# Patient Record
Sex: FEMALE | Race: WHITE | URBAN - METROPOLITAN AREA
[De-identification: names, ages, dates, MRNs, and addresses within clinical notes are randomized per-mention and may not be internally consistent; named-entity substitution may affect disease eponyms.]

---

## 2021-07-09 ENCOUNTER — HOSPITAL ENCOUNTER (OUTPATIENT)
Dept: PHYSICAL THERAPY | Age: 61
Discharge: HOME | End: 2021-07-09

## 2021-07-09 PROCEDURE — 9990000029 HC GAP PACKAGE: Performed by: SPECIALIST/TECHNOLOGIST

## 2021-07-09 NOTE — FLOWSHEET NOTE
[facility], [place] [address] [address]  Phone: [phone]  Fax [phone]      Date:  2021    Patient Name:  Jasper Spann    :  1960  MRN: [id number]  Restrictions/Precautions:    Medical/Treatment Diagnosis Information:  ·  none - general strengthening  ·    Physician Information:   none    Patient is Post-Op [] Yes   [] No     DOS:           21         Subjective Coming with a friend to get Pilates routine going          Always sleeps on 45 degree angle - back pain laying flat                    1/7 2/7 3/7 4/7 5/7 6/7 7/7   Objective Hamstring 90                   Goals 1.  Decrease back pain, TA strengthening                   Reformer Exercises Squats 2R1B 2x10  Walking 2R 2x10  Raises 2R 2x10         Pelvic Stabilization   Bridges 3R 2x10  Bar lowered due to back pain          Standing 1R1Y 2x10                             Trunk Stabilization Attempted lat pulls, caused too much strain on back to continue                                       Hip Disassociation 2R 2x10          arcs                    Flossing 2R 2x10         Scapular Stabilization                                        Thoracic Mobility                                        General ROM Hamstring 1 min                    Psoas 1R 1 min   Did not feel stretch as well                   Other                                        Summary/Comments HEP: kim, laying flat hamstring stretches                                          Electronically signed by:  Willa Hatch

## 2021-07-13 ENCOUNTER — HOSPITAL ENCOUNTER (OUTPATIENT)
Dept: PHYSICAL THERAPY | Age: 61
Discharge: HOME | End: 2021-07-13

## 2021-07-13 NOTE — FLOWSHEET NOTE
[facility], [address]  Phone: [phone]  Fax [phone]      Date:  2021    Patient Name:  Dagoberto Savage    :  1960  MRN: [id number]  Restrictions/Precautions:    Medical/Treatment Diagnosis Information:  ·  none - general strengthening     Physician Information:   none    Patient is Post-Op [] Yes   [] No     DOS:           21        Subjective Coming with a friend to get Pilates routine going Back felt fine after session. Has been trying to lay flat more. Always sleeps on 45 degree angle - back pain laying flat                    1/7 2/7 3/7 4/7 5/7 6/7 7/7   Objective Hamstring 90                   Goals 1.  Decrease back pain, TA strengthening                   Reformer Exercises Squats 2R1B 2x10  Walking 2R 2x10  Raises 2R 2x10 4# Soleus pump 3B 2x10        Pelvic Stabilization   Bridges 3R 2x10  Bar lowered due to back pain 1# bridges on trap table with posterior springs yellow long 2x10         Standing 1R1Y 2x10 3# 2x10                            Trunk Stabilization Attempted lat pulls, caused too much strain on back to continue 2# Trap table  Lat pulls, marches                                      Hip Disassociation 2R 2x10 5# 2R 2x10         arcs Arcs, V's circles                   Flossing 2R 2x10         Scapular Stabilization                                        Thoracic Mobility                                        General ROM Hamstring 1 min 6# 1 min                   Psoas 1R 1 min   Did not feel stretch as well                   Other                                        Summary/Comments HEP: bridges, laying flat hamstring stretches                                          Electronically signed by:  Lauren Suarez

## 2021-07-20 ENCOUNTER — HOSPITAL ENCOUNTER (OUTPATIENT)
Dept: PHYSICAL THERAPY | Age: 61
Discharge: HOME | End: 2021-07-20
Payer: COMMERCIAL

## 2021-07-20 NOTE — FLOWSHEET NOTE
[facility], [address]  Phone: [phone]  Fax [phone]      Date:  2021    Patient Name:  Den Jackson    :  1960  MRN: [id number]  Restrictions/Precautions:    Medical/Treatment Diagnosis Information:  ·  none - general strengthening     Physician Information:   none    Patient is Post-Op [] Yes   [] No     DOS:           21       Subjective Coming with a friend to get Pilates routine going Back felt fine after session. Has been trying to lay flat more. Doing ok from sessions. Trying to lie supine more, but having back pain sometimes. Always sleeps on 45 degree angle - back pain laying flat                    1/7 2/7 3/7 4/7 5/7 6/7 7/7   Objective Hamstring 90  Start with quiet supine laying 2 min                 Goals 1.  Decrease back pain, TA strengthening                   Reformer Exercises Squats 2R1B 2x10  Walking 2R 2x10  Raises 2R 2x10 4# Soleus pump 3B 2x10 #7  2x10 2R1G        2x10        2x10        U squat 2R1B 15x         Pelvic Stabilization   Bridges 3R 2x10  Bar lowered due to back pain 1# bridges on trap table with posterior springs yellow long 2x10 #3 2x10        Standing 1R1Y 2x10 3# 2x10 #10 15x                           Trunk Stabilization Attempted lat pulls, caused too much strain on back to continue 2# Trap table  Lat pulls, marches #5 15x                                     Hip Disassociation 2R 2x10 5# 2R 2x10 #8 2x10 2R1B        arcs Arcs, V's circles           #4 Trap table marches and biking long yellow spring around thigh 12x        Flossing 2R 2x10         Scapular Stabilization                                        Thoracic Mobility   #6 wunda assisted thoracic mobility - not to full extension                                     General ROM Hamstring 1 min 6# 1 min #9 1 min                  Psoas 1R 1 min   Did not feel stretch as well Other   #1 posterior pelvic tilt 10x  #2 SB knee to chest 15x                                     Summary/Comments HEP: bridges, laying flat hamstring stretches  Continue to progress                                        Electronically signed by:  Lou Mayes

## 2021-07-27 ENCOUNTER — HOSPITAL ENCOUNTER (OUTPATIENT)
Dept: PHYSICAL THERAPY | Age: 61
Discharge: HOME | End: 2021-07-27

## 2021-07-27 NOTE — FLOWSHEET NOTE
[facility], [address]  Phone: [phone]  Fax [phone]      Date:  2021    Patient Name:  Riddhi Hilliard    :  1960  MRN: [id number]  Restrictions/Precautions:    Medical/Treatment Diagnosis Information:  ·  none - general strengthening     Physician Information:   none    Patient is Post-Op [] Yes   [] No     DOS:           21      Subjective Coming with a friend to get Pilates routine going Back felt fine after session. Has been trying to lay flat more. Doing ok from sessions. Trying to lie supine more, but having back pain sometimes. Definitely felt it more after last time, but not in a bad way. Interested in having a couple of things to do at home. Always sleeps on 45 degree angle - back pain laying flat                    1/7 2/7 3/7 4/7 5/7 6/7 7/7   Objective Hamstring 90  Start with quiet supine laying 2 min HEP [employer]  [id number]                Goals 1.  Decrease back pain, TA strengthening                   Reformer Exercises    Lumbar Roll    3 folded towels under head with headrest up Squats 2R1B 2x10  Walking 2R 2x10  Raises 2R 2x10 4# Soleus pump 3B 2x10 #7  2x10 2R1G        2x10        2x10        U squat 2R1B 15x   #7  2x10 2R1G        2x10        2x10        U squat 2R1B 15x      Pelvic Stabilization   Bridges 3R 2x10  Bar lowered due to back pain 1# bridges on trap table with posterior springs yellow long 2x10 #3 2x10 #3 2x10       Standing 1R1Y 2x10 3# 2x10 #10 15x #10 15x                          Trunk Stabilization Attempted lat pulls, caused too much strain on back to continue 2# Trap table  Lat pulls, marches #5 15x #5 15x                                    Hip Disassociation 2R 2x10 5# 2R 2x10 #8 2x10 2R1B #8 2x10 2R1B       arcs Arcs, V's circles           #4 Trap table marches and biking long yellow spring around thigh 12x #4  12x       Flossing 2R 2x10 Scapular Stabilization                                        Thoracic Mobility   #6 wunda assisted thoracic mobility - not to full extension #6 3B 10x                                    General ROM Hamstring 1 min 6# 1 min #9 1 min #9 1 min                 Psoas 1R 1 min   Did not feel stretch as well                   Other   #1 posterior pelvic tilt 10x  #2 SB knee to chest 15x #1 posterior pelvic tilt 10x  #2 SB knee to chest 15x                                    Summary/Comments HEP: bridges, laying flat hamstring stretches  Continue to progress Access Code: [email or web address]  URL: [email or web address]  Date: 07/27/2021  Prepared by: Catrachito Montes    Exercises  Supine Posterior Pelvic Tilt - 1 x daily - 7 x weekly - 2 sets - 10 reps  Supine Hip and Knee Flexion AROM with Swiss Ball - 1 x daily - 7 x weekly - 2 sets - 10 reps  Seated Thoracic Extension AROM - 1 x daily - 7 x weekly - 1 sets - 10 reps  Cat-Camel - 1 x daily - 7 x weekly - 1 sets - 10 reps                                         Electronically signed by:  Catrachito Montes

## 2021-08-10 ENCOUNTER — HOSPITAL ENCOUNTER (OUTPATIENT)
Dept: PHYSICAL THERAPY | Age: 61
Discharge: HOME | End: 2021-08-10

## 2021-08-10 NOTE — FLOWSHEET NOTE
[facility], [facility], [address] [place] [address] Road  Phone: [phone]  Fax [phone]      Date:  8/10/2021    Patient Name:  Jeanna Lyons    :  1960  MRN: [id number]  Restrictions/Precautions:    Medical/Treatment Diagnosis Information:  ·  none - general strengthening     Physician Information:   none    Patient is Post-Op [] Yes   [] No     DOS:           7/9/21 7/13/21 7/20/21 7/27/21 8/10/21     Subjective Coming with a friend to get Pilates routine going Back felt fine after session. Has been trying to lay flat more. Doing ok from sessions. Trying to lie supine more, but having back pain sometimes. Definitely felt it more after last time, but not in a bad way. Interested in having a couple of things to do at home. Able to get exercises in to routine, not daily but close. Notices a small difference in pain decrease and function increase. Still has a spasm in back when going from supine to sit/stand      Always sleeps on 45 degree angle - back pain laying flat                    1/7 2/7 3/7 4/7 5/7 6/7 7/7   Objective Hamstring 90  Start with quiet supine laying 2 min HEP [employer]  [id number]                Goals 1.  Decrease back pain, TA strengthening                   Reformer Exercises    Lumbar Roll    3 folded towels under head with headrest up Squats 2R1B 2x10  Walking 2R 2x10  Raises 2R 2x10 4# Soleus pump 3B 2x10 #7  2x10 2R1G        2x10        2x10        U squat 2R1B 15x   #7  2x10 2R1G        2x10        2x10        U squat 2R1B 15x #7  25x 2R1G, ER        2x10        2x10/10sec stretch        U squat 2R1B 20x     Pelvic Stabilization   Bridges 3R 2x10  Bar lowered due to back pain 1# bridges on trap table with posterior springs yellow long 2x10 #3 2x10 #3 2x10 #3 2x10      Standing 1R1Y 2x10 3# 2x10 #10 15x #10 15x #11 2x10                         Trunk Stabilization Attempted lat pulls, caused too much strain on back to continue 2# Trap table  Lat pulls, marches #5 15x #5 15x #5 10x3 marches at a time    Tabletop position practice 4x 8sec                                   Hip Disassociation 2R 2x10 5# 2R 2x10 #8 2x10 2R1B #8 2x10 2R1B #8 2x10 2R1B      arcs Arcs, V's circles           #4 Trap table marches and biking long yellow spring around thigh 12x #4  12x #4 2x10      Flossing 2R 2x10         Scapular Stabilization                                        Thoracic Mobility   #6 wunda assisted thoracic mobility - not to full extension #6 3B 10x #6 3B 10x  Increased extension, still not full                                   General ROM Hamstring 1 min 6# 1 min #9 1 min #9 1 min #9 1 min          #10 post capsule      Psoas 1R 1 min   Did not feel stretch as well                   Other   #1 posterior pelvic tilt 10x  #2 SB knee to chest 15x #1 posterior pelvic tilt 10x  #2 SB knee to chest 15x #1 posterior pelvic tilt 10x  #2 SB knee to chest 15x                                   Summary/Comments HEP: bridges, laying flat hamstring stretches  Continue to progress Access Code: [email or web address]  URL: [email or web address]  Date: 07/27/2021  Prepared by: Kisha Benitez    Exercises  Supine Posterior Pelvic Tilt - 1 x daily - 7 x weekly - 2 sets - 10 reps  Supine Hip and Knee Flexion AROM with Swiss Ball - 1 x daily - 7 x weekly - 2 sets - 10 reps  Seated Thoracic Extension AROM - 1 x daily - 7 x weekly - 1 sets - 10 reps  Cat-Camel - 1 x daily - 7 x weekly - 1 sets - 10 reps                                         Electronically signed by:  Kisha Benitez

## 2021-08-20 ENCOUNTER — HOSPITAL ENCOUNTER (OUTPATIENT)
Dept: PHYSICAL THERAPY | Age: 61
Discharge: HOME | End: 2021-08-20

## 2021-08-20 NOTE — FLOWSHEET NOTE
[facility], [facility], [address] [place] [address] Road  Phone: [phone]  Fax [phone]      Date:  2021    Patient Name:  Zach Carmona    :  1960  MRN: [id number]  Restrictions/Precautions:    Medical/Treatment Diagnosis Information:  ·  none - general strengthening     Physician Information:   none    Patient is Post-Op [] Yes   [] No     DOS:           7/9/21 7/13/21 7/20/21 7/27/21 8/10/21 8/20/21    Subjective Coming with a friend to get Pilates routine going Back felt fine after session. Has been trying to lay flat more. Doing ok from sessions. Trying to lie supine more, but having back pain sometimes. Definitely felt it more after last time, but not in a bad way. Interested in having a couple of things to do at home. Able to get exercises in to routine, not daily but close. Notices a small difference in pain decrease and function increase. Still has a spasm in back when going from supine to sit/stand Doing well, working on HEP. Always sleeps on 45 degree angle - back pain laying flat                    1/7 2/7 3/7 4/7 5/7 6/7 7/7   Objective Hamstring 90  Start with quiet supine laying 2 min HEP [employer]  [id number]  Still requested warm up for this session              Goals 1.  Decrease back pain, TA strengthening                   Reformer Exercises    Lumbar Roll    3 folded towels under head with headrest up Squats 2R1B 2x10  Walking 2R 2x10  Raises 2R 2x10 4# Soleus pump 3B 2x10 #7  2x10 2R1G        2x10        2x10        U squat 2R1B 15x   #7  2x10 2R1G        2x10        2x10        U squat 2R1B 15x #7  25x 2R1G, ER        2x10        2x10/10sec stretch        U squat 2R1B 20x #7  25x 2R1G, ER        2x10        2x10/10sec stretch        U squat 2R1B 20x    Pelvic Stabilization   Bridges 3R 2x10  Bar lowered due to back pain 1# bridges on trap table with posterior springs yellow long 2x10 #3 2x10 #3 2x10 #3 2x10 #3 2x10

## 2021-08-27 ENCOUNTER — HOSPITAL ENCOUNTER (OUTPATIENT)
Dept: PHYSICAL THERAPY | Age: 61
Discharge: HOME | End: 2021-08-27

## 2021-08-27 NOTE — FLOWSHEET NOTE
[facility], [facility], [address] [place] [address] Road  Phone: [phone]  Fax [phone]      Date:  2021    Patient Name:  Geovanna Osborne    :  1960  MRN: [id number]  Restrictions/Precautions:    Medical/Treatment Diagnosis Information:  ·  none - general strengthening     Physician Information:   none    Patient is Post-Op [] Yes   [] No     DOS:           7/9/21 7/13/21 7/20/21 7/27/21 8/10/21 8/20/21 8/27/21   Subjective Coming with a friend to get Pilates routine going Back felt fine after session. Has been trying to lay flat more. Doing ok from sessions. Trying to lie supine more, but having back pain sometimes. Definitely felt it more after last time, but not in a bad way. Interested in having a couple of things to do at home. Able to get exercises in to routine, not daily but close. Notices a small difference in pain decrease and function increase. Still has a spasm in back when going from supine to sit/stand Doing well, working on HEP. Back has been more painful this week. May be from not doing HEP as much and helping a friend move. Always sleeps on 45 degree angle - back pain laying flat                    1/7 2/7 3/7 4/7 5/7 6/7 7/7   Objective Hamstring 90  Start with quiet supine laying 2 min HEP [employer]  [id number]  Still requested warm up for this session Discussion about referring to MD and PT or continuing with [employer] for another package. Decided on another package. Goals 1.  Decrease back pain, TA strengthening                   Reformer Exercises    Lumbar Roll    3 folded towels under head with headrest up Squats 2R1B 2x10  Walking 2R 2x10  Raises 2R 2x10 4# Soleus pump 3B 2x10 #7  2x10 2R1G        2x10        2x10        U squat 2R1B 15x   #7  2x10 2R1G        2x10        2x10        U squat 2R1B 15x #7  25x 2R1G, ER        2x10        2x10/10sec stretch        U squat 2R1B 20x #7  25x 2R1G, ER        2x10 2x10/10sec stretch        U squat 2R1B 20x #7  15x 2R1G        15x        15x/10sec stretch        U squat 2R1B 20x   Pelvic Stabilization   Bridges 3R 2x10  Bar lowered due to back pain 1# bridges on trap table with posterior springs yellow long 2x10 #3 2x10 #3 2x10 #3 2x10 #3 2x10 #3 2x10    Standing 1R1Y 2x10 3# 2x10 #10 15x #10 15x #11 2x10 #11 2x10 #12 2x10                       Trunk Stabilization Attempted lat pulls, caused too much strain on back to continue 2# Trap table  Lat pulls, marches #5 15x #5 15x #5 10x3 marches at a time    Tabletop position practice 4x 8sec #5 10x3 marches at a time    Tabletop position practice 4x 8sec #5 10x3 marches at a time    4x 8 sec with lat pull                                 Hip Disassociation 2R 2x10 5# 2R 2x10 #8 2x10 2R1B #8 2x10 2R1B #8 2x10 2R1B #8 2x10 2R  Responded well to less resistance for more ab work #8 2x10 2R    arcs Arcs, V's circles           #4 Trap table marches and biking long yellow spring around thigh 12x #4  12x #4 2x10 #4 2x10 #4 2x10    Flossing 2R 2x10         Scapular Stabilization       #11 rows 2R 2x10                                 Thoracic Mobility   #6 wunda assisted thoracic mobility - not to full extension #6 3B 10x #6 3B 10x  Increased extension, still not full #6 3B 10x #6 3B 15x                                 General ROM Hamstring 1 min 6# 1 min #9 1 min #9 1 min #9 1 min #9 1 min # 9 1 min        #10 post capsule #10 post capsule #10 1 min    Psoas 1R 1 min   Did not feel stretch as well      #13 mermaid             Other   #1 posterior pelvic tilt 10x  #2 SB knee to chest 15x #1 posterior pelvic tilt 10x  #2 SB knee to chest 15x #1 posterior pelvic tilt 10x  #2 SB knee to chest 15x #1 posterior pelvic tilt 10x  #2 SB knee to chest 15x #1 posterior pelvic tilt 10x  #2 SB knee to chest 15x                                 Summary/Comments HEP: bridges, laying flat hamstring stretches  Continue to progress Access Code: [email or web address]  URL: [email or web address]  Date: 07/27/2021  Prepared by: Bertram Okeefe    Exercises  Supine Posterior Pelvic Tilt - 1 x daily - 7 x weekly - 2 sets - 10 reps  Supine Hip and Knee Flexion AROM with Swiss Ball - 1 x daily - 7 x weekly - 2 sets - 10 reps  Seated Thoracic Extension AROM - 1 x daily - 7 x weekly - 1 sets - 10 reps  Cat-Camel - 1 x daily - 7 x weekly - 1 sets - 10 reps                                         Electronically signed by:  Bertram Okeefe

## 2021-09-14 ENCOUNTER — HOSPITAL ENCOUNTER (OUTPATIENT)
Dept: PHYSICAL THERAPY | Age: 61
Discharge: HOME | End: 2021-09-14

## 2021-09-14 PROCEDURE — 9990000029 HC GAP PACKAGE: Performed by: SPECIALIST/TECHNOLOGIST

## 2021-09-14 NOTE — FLOWSHEET NOTE
[facility]  Phone: [phone]  Fax [phone]      Date:  2021    Patient Name:  Kareem Napoles    :  1960  MRN: [id number]  Restrictions/Precautions:    Medical/Treatment Diagnosis Information:  ·  none - general strengthening     Physician Information:   none    Patient is Post-Op [] Yes   [] No     DOS:      Charge Package $210: 21         Subjective Doing a little better. Always sleeps on 45 degree angle - back pain laying flat                    1/7 2/7 3/7 4/7 5/7 6/7 7/7   Objective Hamstring 90  Start with quiet supine laying 2 min HEP [employer]  [id number]  Still requested warm up for this session Discussion about referring to MD and PT or continuing with [employer] for another package. Decided on another package. Goals 1.  Decrease back pain, TA strengthening                   Reformer Exercises    Lumbar Roll    3 folded towels under head with headrest up #7  15x 2R1G        15x        15x/10sec stretch        U squat 2R1B 20x 4# Soleus pump 3B 2x10 #7  2x10 2R1G        2x10        2x10        U squat 2R1B 15x   #7  2x10 2R1G        2x10        2x10        U squat 2R1B 15x #7  25x 2R1G, ER        2x10        2x10/10sec stretch        U squat 2R1B 20x #7  25x 2R1G, ER        2x10        2x10/10sec stretch        U squat 2R1B 20x #7  15x 2R1G        15x        15x/10sec stretch        U squat 2R1B 20x   Pelvic Stabilization   #3 2x10 1# bridges on trap table with posterior springs yellow long 2x10 #3 2x10 #3 2x10 #3 2x10 #3 2x10 #3 2x10    #12 Standing 1R1Y 2x10 3# 2x10 #10 15x #10 15x #11 2x10 #11 2x10 #12 2x10                       Trunk Stabilization #5 10x3 marches at a time    4x 8 sec with lat pull 2# Trap table  Lat pulls, marches #5 15x #5 15x #5 10x3 marches at a time    Tabletop position practice 4x 8sec #5 10x3 marches at a time    Tabletop position practice 4x 8sec #5 10x3 marches at a time    4x 8 sec with lat pull                                 Hip Disassociation #8 2R 2x10 5# 2R 2x10 #8 2x10 2R1B #8 2x10 2R1B #8 2x10 2R1B #8 2x10 2R  Responded well to less resistance for more ab work #8 2x10 2R     Arcs, V's circles         #4 Trap table marches and biking long yellow spring around thigh 12x  #4 Trap table marches and biking long yellow spring around thigh 12x #4  12x #4 2x10 #4 2x10 #4 2x10             Scapular Stabilization #11 rows 2R 2x10      #11 rows 2R 2x10                                 Thoracic Mobility #6 3B 15x  Further extension  #6 wunda assisted thoracic mobility - not to full extension #6 3B 10x #6 3B 10x  Increased extension, still not full #6 3B 10x #6 3B 15x                                 General ROM Hamstring 1 min 6# 1 min #9 1 min #9 1 min #9 1 min #9 1 min # 9 1 min    #10 post capsule    #10 post capsule #10 post capsule #10 1 min    Psoas 1R 1 min   Did not feel stretch as well      #13 mermaid             Other   #1 posterior pelvic tilt 10x  #2 SB knee to chest 15x #1 posterior pelvic tilt 10x  #2 SB knee to chest 15x #1 posterior pelvic tilt 10x  #2 SB knee to chest 15x #1 posterior pelvic tilt 10x  #2 SB knee to chest 15x #1 posterior pelvic tilt 10x  #2 SB knee to chest 15x                                 Summary/Comments HEP: bridges, laying flat hamstring stretches  Continue to progress Access Code: [email or web address]  URL: [email or web address]  Date: 07/27/2021  Prepared by: Evin Tellez    Exercises  Supine Posterior Pelvic Tilt - 1 x daily - 7 x weekly - 2 sets - 10 reps  Supine Hip and Knee Flexion AROM with Swiss Ball - 1 x daily - 7 x weekly - 2 sets - 10 reps  Seated Thoracic Extension AROM - 1 x daily - 7 x weekly - 1 sets - 10 reps  Cat-Camel - 1 x daily - 7 x weekly - 1 sets - 10 reps                                         Electronically signed by:  Evin Tellez

## 2021-09-21 ENCOUNTER — HOSPITAL ENCOUNTER (OUTPATIENT)
Dept: PHYSICAL THERAPY | Age: 61
Discharge: HOME | End: 2021-09-21

## 2021-09-21 NOTE — FLOWSHEET NOTE
[facility] [address]  Phone: [phone]  Fax [phone]      Date:  2021    Patient Name:  Sisi Rader    :  1960  MRN: [id number]  Restrictions/Precautions:    Medical/Treatment Diagnosis Information:  ·  none - general strengthening     Physician Information:   none    Patient is Post-Op [] Yes   [] No     DOS:      Charge Package $210: 21        Subjective Doing a little better. In pain now, but pain has been better and getting out of bed is easier. Always sleeps on 45 degree angle - back pain laying flat                    1/7 2/7 3/7 4/7 5/7 6/7 7/7   Objective Hamstring 90  Start with quiet supine laying 2 min HEP Medbridge  [id number]  Still requested warm up for this session Discussion about referring to MD and PT or continuing with [facility] for another package. Decided on another package. Goals 1.  Decrease back pain, TA strengthening                   Reformer Exercises    Lumbar Roll    3 folded towels under head with headrest up #7  15x 2R1G        15x        15x/10sec stretch        U squat 2R1B 20x #7  15x 2R1G        15x        15x/10sec stretch        U squat 2R1B 20x #7  2x10 2R1G        2x10        2x10        U squat 2R1B 15x   #7  2x10 2R1G        2x10        2x10        U squat 2R1B 15x #7  25x 2R1G, ER        2x10        2x10/10sec stretch        U squat 2R1B 20x #7  25x 2R1G, ER        2x10        2x10/10sec stretch        U squat 2R1B 20x #7  15x 2R1G        15x        15x/10sec stretch        U squat 2R1B 20x   Pelvic Stabilization   #3 2x10 1# bridges on trap table with posterior springs yellow long 2x10 #3 2x10 #3 2x10 #3 2x10 #3 2x10 #3 2x10    #12 Standing 1R1Y 2x10 3# 2x10 #10 15x #10 15x #11 2x10 #11 2x10 #12 2x10                       Trunk Stabilization #5 10x3 marches at a time    4x 8 sec with lat pull 2# Trap table  Lat pulls, marches    Tabletop position practice 4x 8sec  With lat pull #5 15x #5 15x #5 10x3 marches at a time    Tabletop position practice 4x 8sec #5 10x3 marches at a time    Tabletop position practice 4x 8sec #5 10x3 marches at a time    4x 8 sec with lat pull                                 Hip Disassociation #8 2R 2x10 5# 2R 2x10 #8 2x10 2R1B #8 2x10 2R1B #8 2x10 2R1B #8 2x10 2R  Responded well to less resistance for more ab work #8 2x10 2R     Arcs, V's circles         #4 Trap table marches and biking long yellow spring around thigh 12x #4 Trap table marches and biking long yellow spring around thigh 12x #4 Trap table marches and biking long yellow spring around thigh 12x #4  12x #4 2x10 #4 2x10 #4 2x10             Scapular Stabilization #11 rows 2R 2x10 #11 rows 2R 2x10     #11 rows 2R 2x10                                 Thoracic Mobility #6 3B 15x  Further extension #6 3B 15x #6 wunda assisted thoracic mobility - not to full extension #6 3B 10x #6 3B 10x  Increased extension, still not full #6 3B 10x #6 3B 15x                                 General ROM Hamstring 1 min #9 1 min #9 1 min #9 1 min #9 1 min #9 1 min # 9 1 min    #10 post capsule    #10 post capsule #10 post capsule #10 1 min    Psoas 1R 1 min   Did not feel stretch as well      #13 mermaid             Other  #1 posterior pelvic tilt 10x  #2 SB knee to chest 15x #1 posterior pelvic tilt 10x  #2 SB knee to chest 15x #1 posterior pelvic tilt 10x  #2 SB knee to chest 15x #1 posterior pelvic tilt 10x  #2 SB knee to chest 15x #1 posterior pelvic tilt 10x  #2 SB knee to chest 15x #1 posterior pelvic tilt 10x  #2 SB knee to chest 15x                                 Summary/Comments HEP: bridges, laying flat hamstring stretches  Continue to progress Access Code: [email or web address]  URL: [email or web address]  Date: 07/27/2021  Prepared by: Noelle Tracy    Exercises  Supine Posterior Pelvic Tilt - 1 x daily - 7 x weekly - 2 sets - 10 reps  Supine Hip and Knee Flexion AROM with Swiss Ball - 1 x daily - 7 x weekly - 2 sets - 10 reps  Seated Thoracic Extension AROM - 1 x daily - 7 x weekly - 1 sets - 10 reps  Cat-Camel - 1 x daily - 7 x weekly - 1 sets - 10 reps                                         Electronically signed by:  Catrachito Montes

## 2021-11-05 ENCOUNTER — HOSPITAL ENCOUNTER (OUTPATIENT)
Dept: PHYSICAL THERAPY | Age: 61
Discharge: HOME | End: 2021-11-05

## 2021-11-05 NOTE — FLOWSHEET NOTE
117 [facility] Giuliano Tay  [address] [place] [address]  Phone: [phone]  Fax [phone]      Date:  2021    Patient Name:  Milan Solorzano    :  1960  MRN: [id number]  Restrictions/Precautions:    Medical/Treatment Diagnosis Information:  ·  none - general strengthening     Physician Information:   none    Patient is Post-Op [] Yes   [] No     DOS:      Charge Package $210: 21       Subjective Doing a little better. In pain now, but pain has been better and getting out of bed is easier. Back pain has increased since it has been awhile        Always sleeps on 45 degree angle - back pain laying flat                    1/7 2/7 3/7 4/7 5/7 6/7 7/7   Objective Hamstring 90  Return to former routine HEP Medbridge  [id number]  Still requested warm up for this session Discussion about referring to MD and PT or continuing with [facility] for another package. Decided on another package. Goals 1.  Decrease back pain, TA strengthening                   Reformer Exercises    Lumbar Roll    3 folded towels under head with headrest up #7  15x 2R1G        15x        15x/10sec stretch        U squat 2R1B 20x #7  15x 2R1G        15x        15x/10sec stretch        U squat 2R1B 20x #7  2x10 2R1G        2x10        2x10        U squat 2R1B 15x   #7  2x10 2R1G        2x10        2x10        U squat 2R1B 15x #7  25x 2R1G, ER        2x10        2x10/10sec stretch        U squat 2R1B 20x #7  25x 2R1G, ER        2x10        2x10/10sec stretch        U squat 2R1B 20x #7  15x 2R1G        15x        15x/10sec stretch        U squat 2R1B 20x   Pelvic Stabilization   #3 2x10 1# bridges on trap table with posterior springs yellow long 2x10 #3 2x10 #3 2x10 #3 2x10 #3 2x10 #3 2x10    #12 Standing 1R1Y 2x10 3# 2x10 #10 15x #10 15x #11 2x10 #11 2x10 #12 2x10                       Trunk Stabilization #5 10x3 marches at a time    4x 8 sec with lat pull 2# Trap table  Lat pulls, marches    Tabletop position practice 4x 8sec  With lat pull #5 10x3 marches at a time    3x 8 sec with lat pull #5 15x #5 10x3 marches at a time    Tabletop position practice 4x 8sec #5 10x3 marches at a time    Tabletop position practice 4x 8sec #5 10x3 marches at a time    4x 8 sec with lat pull                                 Hip Disassociation #8 2R 2x10 5# 2R 2x10 #8 2x10 2R #8 2x10 2R1B #8 2x10 2R1B #8 2x10 2R  Responded well to less resistance for more ab work #8 2x10 2R     Arcs, V's circles         #4 Trap table marches and biking long yellow spring around thigh 12x #4 Trap table marches and biking long yellow spring around thigh 12x #4 Trap table marches and biking long yellow spring around thigh 12x #4  12x #4 2x10 #4 2x10 #4 2x10             Scapular Stabilization #11 rows 2R 2x10 #11 rows 2R 2x10     #11 rows 2R 2x10                                 Thoracic Mobility #6 3B 15x  Further extension #6 3B 15x #6 wunda assisted thoracic mobility - not to full extension #6 3B 10x #6 3B 10x  Increased extension, still not full #6 3B 10x #6 3B 15x                                 General ROM Hamstring 1 min #9 1 min #9 1 min #9 1 min #9 1 min #9 1 min # 9 1 min    #10 post capsule    #10 post capsule #10 post capsule #10 1 min    Psoas 1R 1 min   Did not feel stretch as well      #13 mermaid             Other  #1 posterior pelvic tilt 10x  #2 SB knee to chest 15x #1 posterior pelvic tilt 10x  #2 SB knee to chest 15x #1 posterior pelvic tilt 10x  #2 SB knee to chest 15x #1 posterior pelvic tilt 10x  #2 SB knee to chest 15x #1 posterior pelvic tilt 10x  #2 SB knee to chest 15x #1 posterior pelvic tilt 10x  #2 SB knee to chest 15x                                 Summary/Comments HEP: bridges, laying flat hamstring stretches  Continue to progress Access Code: [email or web address]  URL: [email or web address]. com/  Date: 07/27/2021  Prepared by: Cassidy Bradley    Exercises  Supine Posterior Pelvic Tilt - 1 x daily - 7 x weekly - 2 sets - 10 reps  Supine Hip and Knee Flexion AROM with Swiss Ball - 1 x daily - 7 x weekly - 2 sets - 10 reps  Seated Thoracic Extension AROM - 1 x daily - 7 x weekly - 1 sets - 10 reps  Cat-Camel - 1 x daily - 7 x weekly - 1 sets - 10 reps                                         Electronically signed by:  Jacinto Zhu

## 2021-11-09 ENCOUNTER — HOSPITAL ENCOUNTER (OUTPATIENT)
Dept: PHYSICAL THERAPY | Age: 61
Discharge: HOME | End: 2021-11-09

## 2021-11-09 NOTE — FLOWSHEET NOTE
117 [facility] Giuliano Tay  [address] [place] [address]  Phone: [phone]  Fax [phone]      Date:  2021    Patient Name:  Juan Connolly    :  1960  MRN: [id number]  Restrictions/Precautions:    Medical/Treatment Diagnosis Information:  ·  none - general strengthening     Physician Information:   none    Patient is Post-Op [] Yes   [] No     DOS:      Charge Package $210: 21      Subjective Doing a little better. In pain now, but pain has been better and getting out of bed is easier. Back pain has increased since it has been awhile Doing well, Session last week helped. Knee feels stiff today       Always sleeps on 45 degree angle - back pain laying flat                    1/7 2/7 3/7 4/7 5/7 6/7 7/7   Objective Hamstring 90  Return to former routine HEP Medbridge  [id number]  Still requested warm up for this session Discussion about referring to MD and PT or continuing with [facility] for another package. Decided on another package. Goals 1.  Decrease back pain, TA strengthening                   Reformer Exercises    Lumbar Roll    3 folded towels under head with headrest up #7  15x 2R1G        15x        15x/10sec stretch        U squat 2R1B 20x #7  15x 2R1G        15x        15x/10sec stretch        U squat 2R1B 20x #7  2x10 2R1G        2x10        2x10        U squat 2R1B 15x   #7  2x10 2R1G        2x10        2x10        U squat 2R1B 15x #7  25x 2R1G, ER        2x10        2x10/10sec stretch        U squat 2R1B 20x #7  25x 2R1G, ER        2x10        2x10/10sec stretch        U squat 2R1B 20x #7  15x 2R1G        15x        15x/10sec stretch        U squat 2R1B 20x   Pelvic Stabilization   #3 2x10 1# bridges on trap table with posterior springs yellow long 2x10 #3 2x10 #3 2x10  No springs #3 2x10 #3 2x10 #3 2x10    #12 Standing 1R1Y 2x10 3# 2x10 #10 15x #10 15x #11 2x10 #11 2x10 #12 2x10 Trunk Stabilization #5 10x3 marches at a time    4x 8 sec with lat pull 2# Trap table  Lat pulls, marches    Tabletop position practice 4x 8sec  With lat pull #5 10x3 marches at a time    3x 8 sec with lat pull #5 15x #5 10x3 marches at a time    Tabletop position practice 4x 8sec #5 10x3 marches at a time    Tabletop position practice 4x 8sec #5 10x3 marches at a time    4x 8 sec with lat pull                                 Hip Disassociation #8 2R 2x10 5# 2R 2x10 #8 2x10 2R #8 2x10 2R1B #8 2x10 2R1B #8 2x10 2R  Responded well to less resistance for more ab work #8 2x10 2R     Arcs, V's circles         #4 Trap table marches and biking long yellow spring around thigh 12x #4 Trap table marches and biking long yellow spring around thigh 12x #4 Trap table marches and biking long yellow spring around thigh 12x  #4 2x10 #4 2x10 #4 2x10             Scapular Stabilization #11 rows 2R 2x10 #11 rows 2R 2x10     #11 rows 2R 2x10                                 Thoracic Mobility #6 3B 15x  Further extension #6 3B 15x #6 wunda assisted thoracic mobility - not to full extension #6 3B 10x #6 3B 10x  Increased extension, still not full #6 3B 10x #6 3B 15x                                 General ROM Hamstring 1 min #9 1 min #9 1 min #9 1 min #9 1 min #9 1 min # 9 1 min    #10 post capsule    #10 post capsule #10 post capsule #10 1 min    Psoas 1R 1 min   Did not feel stretch as well      #13 mermaid             Other  #1 posterior pelvic tilt 10x  #2 SB knee to chest 15x #1 posterior pelvic tilt 10x  #2 SB knee to chest 15x #1 posterior pelvic tilt 10x  #2 SB knee to chest 15x #1 posterior pelvic tilt 10x  #2 SB knee to chest 15x #1 posterior pelvic tilt 10x  #2 SB knee to chest 15x #1 posterior pelvic tilt 10x  #2 SB knee to chest 15x                                 Summary/Comments HEP: bridges, laying flat hamstring stretches  Continue to progress Access Code: [email or web address]  URL: [email or web address]  Date: 07/27/2021  Prepared by: Carmen Magaña    Exercises  Supine Posterior Pelvic Tilt - 1 x daily - 7 x weekly - 2 sets - 10 reps  Supine Hip and Knee Flexion AROM with Swiss Ball - 1 x daily - 7 x weekly - 2 sets - 10 reps  Seated Thoracic Extension AROM - 1 x daily - 7 x weekly - 1 sets - 10 reps  Cat-Camel - 1 x daily - 7 x weekly - 1 sets - 10 reps                                         Electronically signed by:  Carmen Magaña

## 2021-11-19 ENCOUNTER — HOSPITAL ENCOUNTER (OUTPATIENT)
Dept: PHYSICAL THERAPY | Age: 61
Discharge: HOME | End: 2021-11-19

## 2021-11-19 NOTE — FLOWSHEET NOTE
[facility] [address]  Phone: [phone]  Fax [phone]      Date:  2021    Patient Name:  Eden Cantrell    :  1960  MRN: [id number]  Restrictions/Precautions:    Medical/Treatment Diagnosis Information:  ·  none - general strengthening     Physician Information:   none    Patient is Post-Op [] Yes   [] No     DOS:      Charge Package $210: 21     Subjective Doing a little better. In pain now, but pain has been better and getting out of bed is easier. Back pain has increased since it has been awhile Doing well, Session last week helped. Knee feels stiff today Doing well, strain on R posteriolateral thigh      Always sleeps on 45 degree angle - back pain laying flat                    1/7 2/7 3/7 4/7 5/7    Objective Hamstring 90  Return to former routine HEP Medbridge  [id number]  Still requested warm up for this session Discussion about referring to MD and PT or continuing with [employer] for another package. Decided on another package. Goals 1.  Decrease back pain, TA strengthening                   Reformer Exercises    Lumbar Roll    3 folded towels under head with headrest up #7  15x 2R1G        15x        15x/10sec stretch        U squat 2R1B 20x #7  15x 2R1G        15x        15x/10sec stretch        U squat 2R1B 20x #7  2x10 2R1G        2x10        2x10        U squat 2R1B 15x   #7  2x10 2R1G        2x10        2x10        U squat 2R1B 15x #7  25x 2R1G, ER        2x10        2x10/10sec stretch        U squat 2R1B 20x #7  25x 2R1G, ER        2x10        2x10/10sec stretch        U squat 2R1B 20x #7  15x 2R1G        15x        15x/10sec stretch        U squat 2R1B 20x   Pelvic Stabilization   #3 2x10 1# bridges on trap table with posterior springs yellow long 2x10 #3 2x10 #3 2x10  No springs #3 2x10 #3 2x10 #3 2x10    #12 Standing 1R1Y 2x10 3# 2x10 #10 15x #10 15x #11 2x10 #11 2x10 #12 2x10                       Trunk Stabilization #5 10x3 marches at a time    4x 8 sec with lat pull 2# Trap table  Lat pulls, marches    Tabletop position practice 4x 8sec  With lat pull #5 10x3 marches at a time    3x 8 sec with lat pull #5 15x #5 10x3 marches at a time    Tabletop position practice 4x 8sec #5 10x3 marches at a time    Tabletop position practice 4x 8sec #5 10x3 marches at a time    4x 8 sec with lat pull                                 Hip Disassociation #8 2R 2x10 5# 2R 2x10 #8 2x10 2R #8 2x10 2R1B #8 2x10 2R1B #8 2x10 2R  Responded well to less resistance for more ab work #8 2x10 2R     Arcs, V's circles         #4 Trap table marches and biking long yellow spring around thigh 12x #4 Trap table marches and biking long yellow spring around thigh 12x #4 Trap table marches and biking long yellow spring around thigh 12x  # #4 2x10 #4 2x10             Scapular Stabilization #11 rows 2R 2x10 #11 rows 2R 2x10     #11 rows 2R 2x10                                 Thoracic Mobility #6 3B 15x  Further extension #6 3B 15x #6 wunda assisted thoracic mobility - not to full extension #6 3B 10x #6 3B 10x   #6 3B 10x #6 3B 15x                                 General ROM Hamstring 1 min #9 1 min #9 1 min #9 1 min #9 1 min with ER #9 1 min # 9 1 min    #10 post capsule    #10 post capsule #10 post capsule #10 1 min    Psoas 1R 1 min   Did not feel stretch as well      #13 mermaid             Other  #1 posterior pelvic tilt 10x  #2 SB knee to chest 15x #1 posterior pelvic tilt 10x  #2 SB knee to chest 15x #1 posterior pelvic tilt 10x  #2 SB knee to chest 15x #1 posterior pelvic tilt 10x  #2 SB knee to chest 15x #1 posterior pelvic tilt 10x  #2 SB knee to chest 15x #1 posterior pelvic tilt 10x  #2 SB knee to chest 15x                                 Summary/Comments HEP: bridges, laying flat hamstring stretches  Continue to progress Access Code: [email or web address]  URL: [email or web address]  Date: 07/27/2021  Prepared by: Nichole Villeda    Exercises  Supine Posterior Pelvic Tilt - 1 x daily - 7 x weekly - 2 sets - 10 reps  Supine Hip and Knee Flexion AROM with Swiss Ball - 1 x daily - 7 x weekly - 2 sets - 10 reps  Seated Thoracic Extension AROM - 1 x daily - 7 x weekly - 1 sets - 10 reps  Cat-Camel - 1 x daily - 7 x weekly - 1 sets - 10 reps                                         Electronically signed by:  Nichole Villeda

## 2021-12-02 ENCOUNTER — HOSPITAL ENCOUNTER (OUTPATIENT)
Dept: PHYSICAL THERAPY | Age: 61
Discharge: HOME | End: 2021-12-02

## 2021-12-02 NOTE — FLOWSHEET NOTE
[facility], [address]  Phone: [phone]  Fax [phone]      Date:  2021    Patient Name:  Demi Dia    :  1960  MRN: [id number]  Restrictions/Precautions:    Medical/Treatment Diagnosis Information:  ·  none - general strengthening     Physician Information:   none    Patient is Post-Op [] Yes   [] No     DOS:      Charge Package $210: 21    Subjective Doing a little better. In pain now, but pain has been better and getting out of bed is easier. Back pain has increased since it has been awhile Doing well, Session last week helped. Knee feels stiff today Doing well, strain on R posteriolateral thigh Doing well - had fallen and finally feeling better     Always sleeps on 45 degree angle - back pain laying flat                    1/7 2/7 3/7 4/7 5/7 6/7 7/7   Objective Hamstring 90  Return to former routine HEP Medbridge  [id number]  Still requested warm up for this session Discussion about referring to MD and PT or continuing with [facility] for another package. Decided on another package. Goals 1.  Decrease back pain, TA strengthening                   Reformer Exercises    Lumbar Roll    3 folded towels under head with headrest up #7  15x 2R1G        15x        15x/10sec stretch        U squat 2R1B 20x #7  15x 2R1G        15x        15x/10sec stretch        U squat 2R1B 20x #7  2x10 2R1G        2x10        2x10        U squat 2R1B 15x   #7  2x10 2R1G        2x10        2x10        U squat 2R1B 15x #7  25x 2R1G, ER        2x10        2x10/10sec stretch        U squat 2R1B 20x #7  25x 2R1G, ER        2x10        2x10/10sec stretch        U squat 2R1B 20x #7  15x 2R1G        15x        15x/10sec stretch        U squat 2R1B 20x   Pelvic Stabilization   #3 2x10 1# bridges on trap table with posterior springs yellow long 2x10 #3 2x10 #3 2x10  No springs #3 2x10 #3 2x10 #3 2x10    #12 Standing 1R1Y 2x10 3# 2x10 #10 15x #10 15x #11 2x10 #11 2x10 #12 2x10                       Trunk Stabilization #5 10x3 marches at a time    4x 8 sec with lat pull 2# Trap table  Lat pulls, marches    Tabletop position practice 4x 8sec  With lat pull #5 10x3 marches at a time    3x 8 sec with lat pull #5 15x #5 10x3 marches at a time    Tabletop position practice 4x 8sec #5 10x3 marches at a time    Tabletop position practice 4x 8sec #5 10x3 marches at a time    4x 8 sec with lat pull                                 Hip Disassociation #8 2R 2x10 5# 2R 2x10 #8 2x10 2R #8 2x10 2R1B #8 2x10 2R1B #8 2x10 2R   #8 2x10 2R     Arcs, V's circles         #4 Trap table marches and biking long yellow spring around thigh 12x #4 Trap table marches and biking long yellow spring around thigh 12x #4 Trap table marches and biking long yellow spring around thigh 12x  #  #4 2x10             Scapular Stabilization #11 rows 2R 2x10 #11 rows 2R 2x10     #11 rows 2R 2x10                                 Thoracic Mobility #6 3B 15x  Further extension #6 3B 15x #6 wunda assisted thoracic mobility - not to full extension #6 3B 10x #6 3B 10x   #6 3B 10x #6 3B 15x                                 General ROM Hamstring 1 min #9 1 min #9 1 min #9 1 min #9 1 min with ER #9 1 min # 9 1 min    #10 post capsule    #10 post capsule #10 post capsule #10 1 min    Psoas 1R 1 min   Did not feel stretch as well      #13 mermaid             Other  #1 posterior pelvic tilt 10x  #2 SB knee to chest 15x #1 posterior pelvic tilt 10x  #2 SB knee to chest 15x #1 posterior pelvic tilt 10x  #2 SB knee to chest 15x #1 posterior pelvic tilt 10x  #2 SB knee to chest 15x #1 posterior pelvic tilt 10x  #2 SB knee to chest 15x #1 posterior pelvic tilt 10x  #2 SB knee to chest 15x                                 Summary/Comments HEP: bridges, laying flat hamstring stretches  Continue to progress Access Code: [email or web address]  URL: [email or web address]/  Date: 07/27/2021  Prepared by: Reggie Gunderson    Exercises  Supine Posterior Pelvic Tilt - 1 x daily - 7 x weekly - 2 sets - 10 reps  Supine Hip and Knee Flexion AROM with Swiss Ball - 1 x daily - 7 x weekly - 2 sets - 10 reps  Seated Thoracic Extension AROM - 1 x daily - 7 x weekly - 1 sets - 10 reps  Cat-Camel - 1 x daily - 7 x weekly - 1 sets - 10 reps                                         Electronically signed by:  Reggie Gunderson

## 2021-12-21 ENCOUNTER — HOSPITAL ENCOUNTER (OUTPATIENT)
Dept: PHYSICAL THERAPY | Age: 61
Discharge: HOME | End: 2021-12-21

## 2021-12-21 NOTE — FLOWSHEET NOTE
[address] [place] [address] Road  Phone: [phone]  Fax [phone]      Date:  2021    Patient Name:  Trey Nielsen    :  1960  MRN: [id number]  Restrictions/Precautions:    Medical/Treatment Diagnosis Information:  ·  none - general strengthening     Physician Information:   none    Patient is Post-Op [] Yes   [] No     DOS:      Charge Package $210: 21   Subjective Doing a little better. In pain now, but pain has been better and getting out of bed is easier. Back pain has increased since it has been awhile Doing well, Session last week helped. Knee feels stiff today Doing well, strain on R posteriolateral thigh Doing well - had fallen and finally feeling better Doing well - knee is feeling a lot better    Always sleeps on 45 degree angle - back pain laying flat                    1/7 2/7 3/7 4/7 5/7 6/7 7/7   Objective Hamstring 90  Return to former routine HEP [employer]  [id number]  Still requested warm up for this session              Goals 1.  Decrease back pain, TA strengthening                   Reformer Exercises    Lumbar Roll    3 folded towels under head with headrest up #7  15x 2R1G        15x        15x/10sec stretch        U squat 2R1B 20x #7  15x 2R1G        15x        15x/10sec stretch        U squat 2R1B 20x #7  2x10 2R1G        2x10        2x10        U squat 2R1B 15x   #7  2x10 2R1G        2x10        2x10        U squat 2R1B 15x #7  25x 2R1G, ER        2x10        2x10/10sec stretch        U squat 2R1B 20x #7  25x 2R1G, ER        2x10        2x10/10sec stretch        U squat 2R1B 20x #7  15x 2R1G        15x        15x/10sec stretch        U squat 2R1B 20x   Pelvic Stabilization   #3 2x10 1# bridges on trap table with posterior springs yellow long 2x10 #3 2x10 #3 2x10  No springs #3 2x10 #3 2x10 #3 2x10 no springs    #12 Standing 1R1Y 2x10 3# 2x10 #10 15x #10 15x #11 2x10 #11 2x10 #12 2x10                       Trunk Stabilization #5 10x3 marches at a time    4x 8 sec with lat pull 2# Trap table  Lat pulls, marches    Tabletop position practice 4x 8sec  With lat pull #5 10x3 marches at a time    3x 8 sec with lat pull #5 15x #5 10x3 marches at a time    Tabletop position practice 4x 8sec #5 10x3 marches at a time    Tabletop position practice 4x 8sec #5 10x3 marches at a time    4x 8 sec with lat pull                                 Hip Disassociation #8 2R 2x10 5# 2R 2x10 #8 2x10 2R #8 2x10 2R1B #8 2x10 2R1B #8 2x10 2R   #8 2x10 2R     Arcs, V's circles         #4 Trap table marches and biking long yellow spring around thigh 12x #4 Trap table marches and biking long yellow spring around thigh 12x #4 Trap table marches and biking long yellow spring around thigh 12x  #               Scapular Stabilization #11 rows 2R 2x10 #11 rows 2R 2x10                                      Thoracic Mobility #6 3B 15x  Further extension #6 3B 15x #6 wunda assisted thoracic mobility - not to full extension #6 3B 10x #6 3B 10x   #6 3B 10x #6 3B 15x                                 General ROM Hamstring 1 min #9 1 min #9 1 min #9 1 min #9 1 min with ER #9 1 min # 9 1 min    #10 post capsule    #10 post capsule #10 post capsule #10 1 min    Psoas 1R 1 min   Did not feel stretch as well      #13 mermaid             Other  #1 posterior pelvic tilt 10x  #2 SB knee to chest 15x #1 posterior pelvic tilt 10x  #2 SB knee to chest 15x #1 posterior pelvic tilt 10x  #2 SB knee to chest 15x #1 posterior pelvic tilt 10x  #2 SB knee to chest 15x #1 posterior pelvic tilt 10x  #2 SB knee to chest 15x #1 posterior pelvic tilt 10x  #2 SB knee to chest 15x                                 Summary/Comments HEP: bridges, laying flat hamstring stretches  Continue to progress Access Code: [email or web address]  URL: [email or web address]  Date: 07/27/2021  Prepared by: Heidi King    Exercises  Supine Posterior Pelvic Tilt - 1 x daily - 7 x weekly - 2 sets - 10 reps  Supine Hip and Knee Flexion AROM with Swiss Ball - 1 x daily - 7 x weekly - 2 sets - 10 reps  Seated Thoracic Extension AROM - 1 x daily - 7 x weekly - 1 sets - 10 reps  Cat-Camel - 1 x daily - 7 x weekly - 1 sets - 10 reps                                         Electronically signed by:  Deanna Julian

## 2022-01-04 ENCOUNTER — HOSPITAL ENCOUNTER (OUTPATIENT)
Dept: PHYSICAL THERAPY | Age: 62
Discharge: HOME | End: 2022-01-04

## 2022-01-04 PROCEDURE — 9990000029 HC GAP PACKAGE: Performed by: SPECIALIST/TECHNOLOGIST

## 2022-01-04 NOTE — FLOWSHEET NOTE
[facility], [address]  Phone: [phone]  Fax [phone]      Date:  2022    Patient Name:  Priscilla Arthur    :  1960  MRN: [id number]  Restrictions/Precautions:    Medical/Treatment Diagnosis Information:  ·  none - general strengthening     Physician Information:   none    Patient is Post-Op [] Yes   [] No     DOS:      Charge Package $210: 21         Subjective Doing well           Always sleeps on 45 degree angle - back pain laying flat                    1/7 2/7 3/7 4/7 5/7 6/7 7/7   Objective Hamstring 90  [facility]  [id number]  Still requested warm up for this session              Goals 1.  Decrease back pain, TA strengthening                   Reformer Exercises    Lumbar Roll    3 folded towels under head with headrest up #7  15x 2R1G        15x        15x/10sec stretch        U squat 2R1B 20x #7  15x 2R1G        15x        15x/10sec stretch        U squat 2R1B 20x #7  2x10 2R1G        2x10        2x10        U squat 2R1B 15x   #7  2x10 2R1G        2x10        2x10        U squat 2R1B 15x #7  25x 2R1G, ER        2x10        2x10/10sec stretch        U squat 2R1B 20x #7  25x 2R1G, ER        2x10        2x10/10sec stretch        U squat 2R1B 20x #7  15x 2R1G        15x        15x/10sec stretch        U squat 2R1B 20x   Pelvic Stabilization   #3 2x10 1# bridges on trap table with posterior springs yellow long 2x10 #3 2x10 #3 2x10  No springs #3 2x10 #3 2x10 #3 2x10 no springs    #12 Standing 1R1Y 2x10 3# 2x10 #10 15x #10 15x #11 2x10 #11 2x10 #12 2x10                       Trunk Stabilization #5 10x3  at a time    4x 8 sec with lat pull 2# Trap table  Lat pulls, marches    Tabletop position practice 4x 8sec  With lat pull #5 10x3  at a time    3x 8 sec with lat pull #5 15x #5 10x3  at a time    Tabletop position practice 4x 8sec #5 10x3 marches at a time    Tabletop position practice 4x 8sec #5 10x3 marches at a time    4x 8 sec with lat pull                                 Hip Disassociation #8 2R 2x10 5# 2R 2x10 #8 2x10 2R #8 2x10 2R1B #8 2x10 2R1B #8 2x10 2R   #8 2x10 2R     Arcs, V's circles         #4 Trap table marches and biking long yellow spring around thigh 12x #4 Trap table marches and biking long yellow spring around thigh 12x #4 Trap table marches and biking long yellow spring around thigh 12x  #               Scapular Stabilization #11 rows 2R 2x10 #11 rows 2R 2x10                                      Thoracic Mobility #6 3B 15x  Further extension #6 3B 15x #6 wunda assisted thoracic mobility - not to full extension #6 3B 10x #6 3B 10x   #6 3B 10x #6 3B 15x                                 General ROM Hamstring 1 min #9 1 min #9 1 min #9 1 min #9 1 min with ER #9 1 min # 9 1 min    #10 post capsule    #10 post capsule #10 post capsule #10 1 min    Psoas 1R 1 min   Did not feel stretch as well      #13 mermaid             Other  #1 posterior pelvic tilt 10x  #2 SB knee to chest 15x #1 posterior pelvic tilt 10x  #2 SB knee to chest 15x #1 posterior pelvic tilt 10x  #2 SB knee to chest 15x #1 posterior pelvic tilt 10x  #2 SB knee to chest 15x #1 posterior pelvic tilt 10x  #2 SB knee to chest 15x #1 posterior pelvic tilt 10x  #2 SB knee to chest 15x                                 Summary/Comments HEP: bridges, laying flat hamstring stretches  Continue to progress Access Code: [email or web address]  URL: [email or web address]  Date: 07/27/2021  Prepared by: Terra Houston    Exercises  Supine Posterior Pelvic Tilt - 1 x daily - 7 x weekly - 2 sets - 10 reps  Supine Hip and Knee Flexion AROM with Swiss Ball - 1 x daily - 7 x weekly - 2 sets - 10 reps  Seated Thoracic Extension AROM - 1 x daily - 7 x weekly - 1 sets - 10 reps  Cat-Camel - 1 x daily - 7 x weekly - 1 sets - 10 reps                                         Electronically signed by:  Rod Chaney Diya Deluca

## 2022-01-14 ENCOUNTER — HOSPITAL ENCOUNTER (OUTPATIENT)
Dept: PHYSICAL THERAPY | Age: 62
Discharge: HOME | End: 2022-01-14

## 2022-01-14 NOTE — FLOWSHEET NOTE
marches at a time    Tabletop position practice 4x 8sec #5 10x3 marches at a time    4x 8 sec with lat pull                                 Hip Disassociation #8 2R 2x10 5# 2R 2x10 #8 2x10 2R #8 2x10 2R1B #8 2x10 2R1B #8 2x10 2R   #8 2x10 2R     Arcs, V's circles         #4 Trap table marches and biking long yellow spring around thigh 12x #4 Trap table marches and biking long yellow spring around thigh 12x #4 Trap table marches and biking long yellow spring around thigh 12x  #               Scapular Stabilization #11 rows 2R 2x10 #11 rows 2R 2x10                                      Thoracic Mobility #6 3B 15x  Further extension #6 3B 15x #6 wunda assisted thoracic mobility - not to full extension #6 3B 10x #6 3B 10x   #6 3B 10x #6 3B 15x                                 General ROM Hamstring 1 min #9 1 min #9 1 min #9 1 min #9 1 min with ER #9 1 min # 9 1 min    #10 post capsule    #10 post capsule #10 post capsule #10 1 min    Psoas 1R 1 min   Did not feel stretch as well      #13 mermaid             Other  #1 posterior pelvic tilt 10x  #2 SB knee to chest 15x #1 posterior pelvic tilt 10x  #2 SB knee to chest 15x #1 posterior pelvic tilt 10x  #2 SB knee to chest 15x #1 posterior pelvic tilt 10x  #2 SB knee to chest 15x #1 posterior pelvic tilt 10x  #2 SB knee to chest 15x #1 posterior pelvic tilt 10x  #2 SB knee to chest 15x                                 Summary/Comments HEP: bridges, laying flat hamstring stretches  Continue to progress Access Code: [email or web address]  URL: [email or web address]  Date: 07/27/2021  Prepared by: Kwasi Whittaker    Exercises  Supine Posterior Pelvic Tilt - 1 x daily - 7 x weekly - 2 sets - 10 reps  Supine Hip and Knee Flexion AROM with Swiss Ball - 1 x daily - 7 x weekly - 2 sets - 10 reps  Seated Thoracic Extension AROM - 1 x daily - 7 x weekly - 1 sets - 10 reps  Cat-Camel - 1 x daily - 7 x weekly - 1 sets - 10 reps                                         Electronically signed by: Nany [place]

## 2022-01-18 ENCOUNTER — HOSPITAL ENCOUNTER (OUTPATIENT)
Dept: PHYSICAL THERAPY | Age: 62
Discharge: HOME | End: 2022-01-18

## 2022-01-18 NOTE — FLOWSHEET NOTE
[facility] Moisés Tayhbert  [address]  Phone: [phone]  Fax [phone]      Date:  2022    Patient Name:  Clifford Land    :  1960  MRN: [id number]  Restrictions/Precautions:    Medical/Treatment Diagnosis Information:  ·  none - general strengthening     Physician Information:   none    Patient is Post-Op [] Yes   [] No     DOS:      Charge Package $210: 21       Subjective Doing well  Doing well Doing well        Always sleeps on 45 degree angle - back pain laying flat                    1/7 2/7 3/7 4/7 5/7 6/7 7/7   Objective Hamstring 90  Only 1 pillow today [facility]P [employer] [facility]  [id number]  Still requested warm up for this session              Goals 1.  Decrease back pain, TA strengthening                   Reformer Exercises    Lumbar Roll    3 folded towels under head with headrest up #7  15x 2R1G        15x        15x/10sec stretch        U squat 2R1B 20x #7  15x 2R1G        15x        15x/10sec stretch        U squat 2R1B 20x #7  2x10 2R1G        2x10        2x10        U squat 2R1B 15x   #7  2x10 2R1G        2x10        2x10        U squat 2R1B 15x #7  25x 2R1G, ER        2x10        2x10/10sec stretch        U squat 2R1B 20x #7  25x 2R1G, ER        2x10        2x10/10sec stretch        U squat 2R1B 20x #7  15x 2R1G        15x        15x/10sec stretch        U squat 2R1B 20x   Pelvic Stabilization   #3 2x10 1# bridges on trap table with posterior springs yellow long 2x10 #3 2x10 #3 2x10  No springs #3 2x10 #3 2x10 #3 2x10 no springs    #12 Standing 1R1Y 2x10 3# 2x10 #10 15x #10 15x #11 2x10 #11 2x10 #12 2x10                       Trunk Stabilization #5 10x3  at a time    4x 8 sec with lat pull 2# Trap table  Lat pulls, donavon    Tabletop position practice 4x 8sec  With lat pull #5 10x3 march at a time    3x 8 sec with lat pull #5 15x #5 10x3 marches at a time    Tabletop position practice 4x 8sec #5 10x3 marches at a time    Tabletop position practice 4x 8sec #5 10x3 marches at a time    4x 8 sec with lat pull                                 Hip Disassociation #8 2R 2x10 5# 2R 2x10 #8 2x10 2R #8 2x10 2R1B #8 2x10 2R1B #8 2x10 2R   #8 2x10 2R     Arcs, V's circles         #4 Trap table marches and biking long yellow spring around thigh 12x #4 Trap table marches and biking long yellow spring around thigh 12x #4 Trap table marches and biking long yellow spring around thigh 12x  #               Scapular Stabilization #11 rows 2R 2x10 #11 rows 2R 2x10                                      Thoracic Mobility #6 3B 15x  Further extension #6 3B 15x #6 wunda assisted thoracic mobility - not to full extension #6 3B 10x #6 3B 10x   #6 3B 10x #6 3B 15x                                 General ROM Hamstring 1 min #9 1 min #9 1 min #9 1 min #9 1 min with ER #9 1 min # 9 1 min    #10 post capsule    #10 post capsule #10 post capsule #10 1 min    Psoas 1R 1 min   Did not feel stretch as well      #13 mermaid             Other  #1 posterior pelvic tilt 10x  #2 SB knee to chest 15x #1 posterior pelvic tilt 10x  #2 SB knee to chest 15x #1 posterior pelvic tilt 10x  #2 SB knee to chest 15x #1 posterior pelvic tilt 10x  #2 SB knee to chest 15x #1 posterior pelvic tilt 10x  #2 SB knee to chest 15x #1 posterior pelvic tilt 10x  #2 SB knee to chest 15x                                 Summary/Comments HEP: bridges, laying flat hamstring stretches  Continue to progress Access Code: [email or web address]  URL: [email or web address]  Date: 07/27/2021  Prepared by: Terra Houston    Exercises  Supine Posterior Pelvic Tilt - 1 x daily - 7 x weekly - 2 sets - 10 reps  Supine Hip and Knee Flexion AROM with Swiss Ball - 1 x daily - 7 x weekly - 2 sets - 10 reps  Seated Thoracic Extension AROM - 1 x daily - 7 x weekly - 1 sets - 10 reps  Cat-Camel - 1 x daily - 7 x weekly - 1 sets - 10 reps Electronically signed by:  Marshall Kendall

## 2022-02-08 ENCOUNTER — HOSPITAL ENCOUNTER (OUTPATIENT)
Dept: PHYSICAL THERAPY | Age: 62
Discharge: HOME | End: 2022-02-08

## 2022-02-08 NOTE — FLOWSHEET NOTE
[facility], [address]  Phone: [phone]  Fax [phone]      Date:  2022    Patient Name:  Nelly Morley    :  1960  MRN: [id number]  Restrictions/Precautions:    Medical/Treatment Diagnosis Information:  ·  none - general strengthening     Physician Information:   none    Patient is Post-Op [] Yes   [] No     DOS:      Charge Package $210: 21      Subjective Doing well  Doing well Doing well Doing well       Always sleeps on 45 degree angle - back pain laying flat                    1/7 2/7 3/7 4/7 5/7 6/7 7/7   Objective Hamstring 90  Only 1 pillow today HEP [facility]  [id number]  Still requested warm up for this session              Goals 1.  Decrease back pain, TA strengthening                   Reformer Exercises    Lumbar Roll    3 folded towels under head with headrest up #7  15x 2R1G        15x        15x/10sec stretch        U squat 2R1B 20x #7  15x 2R1G        15x        15x/10sec stretch        U squat 2R1B 20x #7  2x10 2R1G        2x10        2x10        U squat 2R1B 15x   #7  2x10 2R1G        2x10        2x10        U squat 2R1B 15x #7  25x 2R1G, ER        2x10        2x10/10sec stretch        U squat 2R1B 20x #7  25x 2R1G, ER        2x10        2x10/10sec stretch        U squat 2R1B 20x #7  15x 2R1G        15x        15x/10sec stretch        U squat 2R1B 20x   Pelvic Stabilization   #3 2x10 1# bridges on trap table with posterior springs yellow long 2x10 #3 2x10 #3 2x10  No springs #3 2x10 #3 2x10 #3 2x10 no springs    #12 Standing 1R1Y 2x10 3# 2x10 #10 15x #10 15x #11 2x10 #11 2x10 #12 2x10                       Trunk Stabilization #5 10x3  at a time    4x 8 sec with lat pull 2# Trap table  Lat pulls, donavon    Tabletop position practice 4x 8sec  With lat pull #5 10x3 marches at a time    3x 8 sec with lat pull #5 15x #5 10x3 marches at a time    Tabletop position practice 4x 8sec #5 10x3 marches at a time    Tabletop position practice 4x 8sec #5 10x3 marches at a time    4x 8 sec with lat pull                                 Hip Disassociation #8 2R 2x10 5# 2R 2x10 #8 2x10 2R #8 2x10 2R1B #8 2x10 2R1B #8 2x10 2R   #8 2x10 2R     Arcs, V's circles         #4 Trap table marches and biking long yellow spring around thigh 12x #4 Trap table marches and biking long yellow spring around thigh 12x #4 Trap table marches and biking long yellow spring around thigh 12x  #               Scapular Stabilization #11 rows 2R 2x10 #11 rows 2R 2x10                                      Thoracic Mobility #6 3B 15x  Further extension #6 3B 15x #6 wunda assisted thoracic mobility - not to full extension #6 3B 10x #6 3B 10x   #6 3B 10x #6 3B 15x                                 General ROM Hamstring 1 min #9 1 min #9 1 min #9 1 min #9 1 min with ER #9 1 min # 9 1 min    #10 post capsule    #10 post capsule #10 post capsule #10 1 min    Psoas 1R 1 min   Did not feel stretch as well      #13 mermaid             Other  #1 posterior pelvic tilt 10x  #2 SB knee to chest 15x #1 posterior pelvic tilt 10x  #2 SB knee to chest 15x #1 posterior pelvic tilt 10x  #2 SB knee to chest 15x #1 posterior pelvic tilt 10x  #2 SB knee to chest 15x #1 posterior pelvic tilt 10x  #2 SB knee to chest 15x #1 posterior pelvic tilt 10x  #2 SB knee to chest 15x                                 Summary/Comments HEP: bridges, laying flat hamstring stretches  Continue to progress Access Code: [email or web address]  URL: [email or web address]  Date: 07/27/2021  Prepared by: iVc Lawson    Exercises  Supine Posterior Pelvic Tilt - 1 x daily - 7 x weekly - 2 sets - 10 reps  Supine Hip and Knee Flexion AROM with Swiss Ball - 1 x daily - 7 x weekly - 2 sets - 10 reps  Seated Thoracic Extension AROM - 1 x daily - 7 x weekly - 1 sets - 10 reps  Cat-Camel - 1 x daily - 7 x weekly - 1 sets - 10 reps Electronically signed by:  Nora Joseph

## 2022-02-15 ENCOUNTER — HOSPITAL ENCOUNTER (OUTPATIENT)
Dept: PHYSICAL THERAPY | Age: 62
Discharge: HOME | End: 2022-02-15

## 2022-02-15 NOTE — FLOWSHEET NOTE
[facility], [address]  Phone: [phone]  Fax [phone]      Date:  2/15/2022    Patient Name:  Dalton Holt    :  1960  MRN: [id number]  Restrictions/Precautions:    Medical/Treatment Diagnosis Information:  ·  none - general strengthening     Physician Information:   none    Patient is Post-Op [] Yes   [] No     DOS:      Charge Package $210: 9/14/21     1/4/21 1/14/22 1/18/22 2/8/22 2/15/22     Subjective Doing well  Doing well Doing well Doing well Doing well      Always sleeps on 45 degree angle - back pain laying flat                    1/7 2/7 3/7 4/7 5/7 6/7 7/7   Objective Hamstring 90  Only 1 pillow today HEP [employer]  [id number]  Still requested warm up for this session              Goals 1.  Decrease back pain, TA strengthening                   Reformer Exercises    Lumbar Roll    3 folded towels under head with headrest up #7  15x 2R1G        15x        15x/10sec stretch        U squat 2R1B 20x #7  15x 2R1G        15x        15x/10sec stretch        U squat 2R1B 20x #7  2x10 2R1G        2x10        2x10        U squat 2R1B 15x   #7  2x10 2R1G        2x10        2x10        U squat 2R1B 15x #7  25x 2R1G, ER        2x10        2x10/10sec stretch        U squat 2R1B 20x #7  25x 2R1G, ER        2x10        2x10/10sec stretch        U squat 2R1B 20x #7  15x 2R1G        15x        15x/10sec stretch        U squat 2R1B 20x   Pelvic Stabilization   #3 2x10 1# bridges on trap table with posterior springs yellow long 2x10 #3 2x10 #3 2x10  No springs #3 2x10 #3 2x10 #3 2x10 no springs    #12 Standing 1R1Y 2x10 3# 2x10 #10 15x #10 15x #11 2x10 #11 2x10 #12 2x10                       Trunk Stabilization #5 10x3  at a time    4x 8 sec with lat pull 2# Trap table  Lat pulls, donavon    Tabletop position practice 4x 8sec  With lat pull #5 10x3 marches at a time    3x 8 sec with lat pull #5 15x #5 10x3 marches at a time    Tabletop position practice 4x 8sec #5 10x3 marches at a time    Tabletop position practice 4x 8sec #5 10x3 marches at a time    4x 8 sec with lat pull                                 Hip Disassociation #8 2R 2x10 5# 2R 2x10 #8 2x10 2R #8 2x10 2R1B #8 2x10 2R1B #8 2x10 2R   #8 2x10 2R     Arcs, V's circles         #4 Trap table marches and biking long yellow spring around thigh 12x #4 Trap table marches and biking long yellow spring around thigh 12x #4 Trap table marches and biking long yellow spring around thigh 12x  #               Scapular Stabilization #11 rows 2R 2x10 #11 rows 2R 2x10                                      Thoracic Mobility #6 3B 15x  Further extension #6 3B 15x #6 wunda assisted thoracic mobility - not to full extension #6 3B 10x #6 3B 10x   #6 3B 10x #6 3B 15x                                 General ROM Hamstring 1 min #9 1 min #9 1 min #9 1 min #9 1 min with ER #9 1 min # 9 1 min    #10 post capsule    #10 post capsule #10 post capsule #10 1 min    Psoas 1R 1 min   Did not feel stretch as well      #13 mermaid             Other  #1 posterior pelvic tilt 10x  #2 SB knee to chest 15x #1 posterior pelvic tilt 10x  #2 SB knee to chest 15x #1 posterior pelvic tilt 10x  #2 SB knee to chest 15x #1 posterior pelvic tilt 10x  #2 SB knee to chest 15x #1 posterior pelvic tilt 10x  #2 SB knee to chest 15x #1 posterior pelvic tilt 10x  #2 SB knee to chest 15x                                 Summary/Comments HEP: bridges, laying flat hamstring stretches  Continue to progress Access Code: [email or web address]  URL: [email or web address]  Date: 07/27/2021  Prepared by: Anny [place]    Exercises  Supine Posterior Pelvic Tilt - 1 x daily - 7 x weekly - 2 sets - 10 reps  Supine Hip and Knee Flexion AROM with Swiss Ball - 1 x daily - 7 x weekly - 2 sets - 10 reps  Seated Thoracic Extension AROM - 1 x daily - 7 x weekly - 1 sets - 10 reps  Cat-Camel - 1 x daily - 7 x weekly - 1 sets - 10 reps Electronically signed by:  Paxton Gaona

## 2022-02-22 ENCOUNTER — HOSPITAL ENCOUNTER (OUTPATIENT)
Dept: PHYSICAL THERAPY | Age: 62
Discharge: HOME | End: 2022-02-22

## 2022-02-22 NOTE — FLOWSHEET NOTE
[facility], [address]  Phone: [phone]  Fax [phone]      Date:  2022    Patient Name:  Kathleen Lynch    :  1960  MRN: [id number]  Restrictions/Precautions:    Medical/Treatment Diagnosis Information:  ·  none - general strengthening     Physician Information:   none    Patient is Post-Op [] Yes   [] No     DOS:      Charge Package $210: 9/14/21     1/4/21 1/14/22 1/18/22 2/8/22 2/15/22 2/22/22    Subjective Doing well  Doing well Doing well Doing well Doing well Doing well     Always sleeps on 45 degree angle - back pain laying flat                    1/7 2/7 3/7 4/7 5/7 6/7 7/7   Objective Hamstring 90  Only 1 pillow today HEP [employer]  [id number]                Goals 1.  Decrease back pain, TA strengthening                   Reformer Exercises    Lumbar Roll    3 folded towels under head with headrest up #7  15x 2R1G        15x        15x/10sec stretch        U squat 2R1B 20x #7  15x 2R1G        15x        15x/10sec stretch        U squat 2R1B 20x #7  2x10 2R1G        2x10        2x10        U squat 2R1B 15x   #7  2x10 2R1G        2x10        2x10        U squat 2R1B 15x #7  25x 2R1G, ER        2x10        2x10/10sec stretch        U squat 2R1B 20x #7  25x 2R1G, ER        2x10        2x10/10sec stretch        U squat 2R1B 20x #7  15x 2R1G        15x        15x/10sec stretch        U squat 2R1B 20x   Pelvic Stabilization   #3 2x10 1# bridges on trap table with posterior springs yellow long 2x10 #3 2x10 #3 2x10  No springs #3 2x10 #3 2x10 #3 2x10 no springs    #12 Standing 1R1Y 2x10 3# 2x10 #10 15x #10 15x #11 2x10 #11 2x10 #12 2x10                       Trunk Stabilization #5 10x3 march at a time    4x 8 sec with lat pull 2# Trap table  Lat pulls, donavon    Tabletop position practice 4x 8sec  With lat pull #5 10x3 marches at a time    3x 8 sec with lat pull #5 15x #5 10x3 marches at a time    Tabletop position practice 4x 8sec #5 lat pulls short yellow     4x 8sec #5 10x3 marches at a time    4x 8 sec with lat pull         1R tabletop on reformer                        Hip Disassociation #8 2R 2x10 5# 2R 2x10 #8 2x10 2R #8 2x10 2R1B #8 2x10 2R1B #8 2x10 2R   #8 2x10 2R     Arcs, V's circles         #4 Trap table marches and biking long yellow spring around thigh 12x #4 Trap table marches and biking long yellow spring around thigh 12x #4 Trap table marches and biking long yellow spring around thigh 12x  #               Scapular Stabilization #11 rows 2R 2x10 #11 rows 2R 2x10                                      Thoracic Mobility #6 3B 15x  Further extension #6 3B 15x #6 wunda assisted thoracic mobility - not to full extension #6 3B 10x #6 3B 10x   #6 3B 10x #6 3B 15x                                 General ROM Hamstring 1 min #9 1 min #9 1 min #9 1 min #9 1 min with ER #9 1 min # 9 1 min    #10 post capsule    #10 post capsule #10 post capsule #10 1 min    Psoas 1R 1 min   Did not feel stretch as well      #13 mermaid             Other  #1 posterior pelvic tilt 10x  #2 SB knee to chest 15x #1 posterior pelvic tilt 10x  #2 SB knee to chest 15x #1 posterior pelvic tilt 10x  #2 SB knee to chest 15x #1 posterior pelvic tilt 10x  #2 SB knee to chest 15x #1 posterior pelvic tilt 10x  #2 SB knee to chest 15x #1 posterior pelvic tilt 10x  #2 SB knee to chest 15x                                 Summary/Comments HEP: bridges, laying flat hamstring stretches  Continue to progress Access Code: [email or web address]  URL: [email or web address]. com/  Date: 07/27/2021  Prepared by: Eligio Salas    Exercises  Supine Posterior Pelvic Tilt - 1 x daily - 7 x weekly - 2 sets - 10 reps  Supine Hip and Knee Flexion AROM with Swiss Ball - 1 x daily - 7 x weekly - 2 sets - 10 reps  Seated Thoracic Extension AROM - 1 x daily - 7 x weekly - 1 sets - 10 reps  Cat-Camel - 1 x daily - 7 x weekly - 1 sets - 10 reps Electronically signed by:  Catherine Gonzales

## 2022-03-04 ENCOUNTER — HOSPITAL ENCOUNTER (OUTPATIENT)
Dept: PHYSICAL THERAPY | Age: 62
Discharge: HOME | End: 2022-03-04

## 2022-03-04 NOTE — FLOWSHEET NOTE
[facility], [address]  Phone: [phone]  Fax [phone]      Date:  3/4/2022    Patient Name:  Livier Rhodes    :  1960  MRN: [id number]  Restrictions/Precautions:    Medical/Treatment Diagnosis Information:  ·  none - general strengthening     Physician Information:   none    Patient is Post-Op [] Yes   [] No     DOS:      Charge Package $210: 9/14/21     1/4/21 1/14/22 1/18/22 2/8/22 2/15/22 2/22/22 3/4/22   Subjective Doing well  Doing well Doing well Doing well Doing well Doing well Doing well    Always sleeps on 45 degree angle - back pain laying flat                    1/7 2/7 3/7 4/7 5/7 6/7 7/7   Objective Hamstring 90  Only 1 pillow today HEP [address] [place], [place] [id number]   Starting with upper body work and then reformer. Goals 1.  Decrease back pain, TA strengthening                   Reformer Exercises    Lumbar Roll    3 folded towels under head with headrest up #7  15x 2R1G        15x        15x/10sec stretch        U squat 2R1B 20x #7  15x 2R1G        15x        15x/10sec stretch        U squat 2R1B 20x #7  2x10 2R1G        2x10        2x10        U squat 2R1B 15x   #7  2x10 2R1G        2x10        2x10        U squat 2R1B 15x #7  25x 2R1G, ER        2x10        2x10/10sec stretch        U squat 2R1B 20x #7  25x 2R1G, ER        2x10        2x10/10sec stretch        U squat 2R1B 20x #3  15x 2R1G        15x        15x/10sec stretch        U squat 2R1B 20x   Pelvic Stabilization   #3 2x10 1# bridges on trap table with posterior springs yellow long 2x10 #3 2x10 #3 2x10  No springs #3 2x10 #3 2x10 #4 2R1B on reformer    #12 Standing 1R1Y 2x10 3# 2x10 #10 15x #10 15x #11 2x10 #11 2x10 #7 2x10                       Trunk Stabilization #5 10x3 marches at a time    4x 8 sec with lat pull 2# Trap table  Lat pulls, marches    Tabletop position practice 4x 8sec  With lat pull #5 10x3 donavon at a time    3x 8 sec with lat pull #5 15x #5 10x3 marches at a time    Tabletop position practice 4x 8sec #5 lat pulls short yellow     4x 8sec #6 1R 15x          1R tabletop on reformer                        Hip Disassociation #8 2R 2x10 5# 2R 2x10 #8 2x10 2R #8 2x10 2R1B #8 2x10 2R1B #8 2x10 2R   #5 2x10 2R     Arcs, V's circles         #4 Trap table marches and biking long yellow spring around thigh 12x #4 Trap table marches and biking long yellow spring around thigh 12x #4 Trap table marches and biking long yellow spring around thigh 12x  #               Scapular Stabilization #11 rows 2R 2x10 #11 rows 2R 2x10     #1 seated on box  Biceps, rows 1R1B  Ext 1R                                 Thoracic Mobility #6 3B 15x  Further extension #6 3B 15x #6 wunda assisted thoracic mobility - not to full extension #6 3B 10x #6 3B 10x   #6 3B 10x #2 1R 10x on reformer                                 General ROM Hamstring 1 min #9 1 min #9 1 min #9 1 min #9 1 min with ER #9 1 min 1 min    #10 post capsule    #10 post capsule #10 post capsule  1 min    Psoas 1R 1 min   Did not feel stretch as well       mermaid             Other  #1 posterior pelvic tilt 10x  #2 SB knee to chest 15x #1 posterior pelvic tilt 10x  #2 SB knee to chest 15x #1 posterior pelvic tilt 10x  #2 SB knee to chest 15x #1 posterior pelvic tilt 10x  #2 SB knee to chest 15x #1 posterior pelvic tilt 10x  #2 SB knee to chest 15x                                  Summary/Comments HEP: bridges, laying flat hamstring stretches  Continue to progress Access Code: [email or web address]  URL: [email or web address]  Date: 07/27/2021  Prepared by: Lidia Barry    Exercises  Supine Posterior Pelvic Tilt - 1 x daily - 7 x weekly - 2 sets - 10 reps  Supine Hip and Knee Flexion AROM with Swiss Ball - 1 x daily - 7 x weekly - 2 sets - 10 reps  Seated Thoracic Extension AROM - 1 x daily - 7 x weekly - 1 sets - 10 reps  Cat-Camel - 1 x daily - 7 x weekly - 1 sets - 10 reps Electronically signed by:  Ricardo Porter

## 2022-03-08 ENCOUNTER — HOSPITAL ENCOUNTER (OUTPATIENT)
Dept: PHYSICAL THERAPY | Age: 62
Discharge: HOME | End: 2022-03-08

## 2022-03-08 PROCEDURE — 9990000029 HC GAP PACKAGE: Performed by: SPECIALIST/TECHNOLOGIST

## 2022-03-08 NOTE — FLOWSHEET NOTE
[facility] [place]  Phone: [phone]  Fax [phone]      Date:  3/8/2022    Patient Name:  Demetrius Faith    :  1960  MRN: [id number]  Restrictions/Precautions:    Medical/Treatment Diagnosis Information:  ·  none - general strengthening     Physician Information:   none    Patient is Post-Op [] Yes   [] No     DOS:      Charge Package $210: 21, 3/8/22     3/8/22         Subjective Doing well                              1/7 2/7 3/7 4/7 5/7 6/7 7/7   Objective Hamstring 90  Only 1 pillow today  [address]  [id number]   Starting with upper body work and then reformer. Goals 1.  Decrease back pain, TA strengthening                   Reformer Exercises    Lumbar Roll    3 folded towels under head with headrest up #7  15x 2R1G        15x        15x/10sec stretch        U squat 2R1B 20x #7  15x 2R1G        15x        15x/10sec stretch        U squat 2R1B 20x #7  2x10 2R1G        2x10        2x10        U squat 2R1B 15x   #7  2x10 2R1G        2x10        2x10        U squat 2R1B 15x #7  25x 2R1G, ER        2x10        2x10/10sec stretch        U squat 2R1B 20x #7  25x 2R1G, ER        2x10        2x10/10sec stretch        U squat 2R1B 20x #3  15x 2R1G        15x        15x/10sec stretch        U squat 2R1B 20x   Pelvic Stabilization   #3 2x10 1# bridges on trap table with posterior springs yellow long 2x10 #3 2x10 #3 2x10  No springs #3 2x10 #3 2x10 #4 2R1B on reformer    #12 Standing 1R1Y 2x10 3# 2x10 #10 15x #10 15x #11 2x10 #11 2x10 #7 2x10                       Trunk Stabilization #5 10x3 marches at a time    4x 8 sec with lat pull 2# Trap table  Lat pulls, marches    Tabletop position practice 4x 8sec  With lat pull #5 10x3 marches at a time    3x 8 sec with lat pull #5 15x #5 10x3 marches at a time    Tabletop position practice 4x 8sec #5 lat pulls short yellow     4x 8sec #6 1R 15x          1R tabletop on reformer                        Hip Disassociation #8 2R 2x10 5# 2R 2x10 #8 2x10 2R #8 2x10 2R1B #8 2x10 2R1B #8 2x10 2R   #5 2x10 2R     Arcs, V's circles         #4 Trap table marches and biking long yellow spring around thigh 12x #4 Trap table marches and biking long yellow spring around thigh 12x #4 Trap table marches and biking long yellow spring around thigh 12x  #               Scapular Stabilization #11 rows 2R 2x10 #11 rows 2R 2x10     #1 seated on box  Biceps, rows 1R1B  Ext 1R                                 Thoracic Mobility #6 3B 15x  Further extension #6 3B 15x #6 wunda assisted thoracic mobility - not to full extension #6 3B 10x #6 3B 10x   #6 3B 10x #2 1R 10x on reformer                                 General ROM Hamstring 1 min #9 1 min #9 1 min #9 1 min #9 1 min with ER #9 1 min 1 min    #10 post capsule    #10 post capsule #10 post capsule  1 min    Psoas 1R 1 min   Did not feel stretch as well       mermaid             Other  #1 posterior pelvic tilt 10x  #2 SB knee to chest 15x #1 posterior pelvic tilt 10x  #2 SB knee to chest 15x #1 posterior pelvic tilt 10x  #2 SB knee to chest 15x #1 posterior pelvic tilt 10x  #2 SB knee to chest 15x #1 posterior pelvic tilt 10x  #2 SB knee to chest 15x                                  Summary/Comments HEP: bridges, laying flat hamstring stretches  Continue to progress Access Code: [email or web address]  URL: [email or web address]. com/  Date: 07/27/2021  Prepared by: Laverna Jeans    Exercises  Supine Posterior Pelvic Tilt - 1 x daily - 7 x weekly - 2 sets - 10 reps  Supine Hip and Knee Flexion AROM with Swiss Ball - 1 x daily - 7 x weekly - 2 sets - 10 reps  Seated Thoracic Extension AROM - 1 x daily - 7 x weekly - 1 sets - 10 reps  Cat-Camel - 1 x daily - 7 x weekly - 1 sets - 10 reps                                         Electronically signed by:  Laverna Jeans

## 2022-03-15 ENCOUNTER — HOSPITAL ENCOUNTER (OUTPATIENT)
Dept: PHYSICAL THERAPY | Age: 62
Discharge: HOME | End: 2022-03-15

## 2022-03-15 NOTE — FLOWSHEET NOTE
[facility], [address]  Phone: [phone]  Fax [phone]      Date:  3/15/2022    Patient Name:  Jimmy Villafana    :  1960  MRN: [id number]  Restrictions/Precautions:    Medical/Treatment Diagnosis Information:  ·  none - general strengthening     Physician Information:   none    Patient is Post-Op [] Yes   [] No     DOS:      Charge Package $210: 21, 3/8/22     3/8/22 3/15/22        Subjective Doing well Doing well                             1/7 2/7 3/7 4/7 5/7 6/7 7/7   Objective Hamstring 90  Only 1 pillow today HEP Shanti Slim  [id number]   Starting with upper body work and then reformer. Goals 1.  Decrease back pain, TA strengthening                   Reformer Exercises    Lumbar Roll    3 folded towels under head with headrest up #7  15x 2R1G        15x        15x/10sec stretch        U squat 2R1B 20x #3  15x 2R1G        15x        15x/10sec stretch        U squat 2R1B 20x #7  2x10 2R1G        2x10        2x10        U squat 2R1B 15x   #7  2x10 2R1G        2x10        2x10        U squat 2R1B 15x #7  25x 2R1G, ER        2x10        2x10/10sec stretch        U squat 2R1B 20x #7  25x 2R1G, ER        2x10        2x10/10sec stretch        U squat 2R1B 20x #3  15x 2R1G        15x        15x/10sec stretch        U squat 2R1B 20x   Pelvic Stabilization   #3 2x10 #4 Bridges 2R1B on reformer #3 2x10 #3 2x10  No springs #3 2x10 #3 2x10 #4 2R1B on reformer    #12 Standing 1R1Y 2x10 8# 2x10 #10 15x #10 15x #11 2x10 #11 2x10 #7 2x10                       Trunk Stabilization #5 10x3 marches at a time    4x 8 sec with lat pull #6 1R 15x  #5 10x3 marches at a time    3x 8 sec with lat pull #5 15x #5 10x3 marches at a time    Tabletop position practice 4x 8sec #5 lat pulls short yellow     4x 8sec #6 1R 15x          1R tabletop on reformer                        Hip Disassociation #8 2R 2x10 5# 2R 2x10 #8 2x10 2R #8 2x10 2R1B #8 2x10 2R1B #8 2x10 2R   #5 2x10 2R     Arcs, V's circles         #4 Trap table marches and biking long yellow spring around thigh 12x #4 Trap table marches and biking long yellow spring around thigh 12x #4 Trap table marches and biking long yellow spring around thigh 12x  #               Scapular Stabilization #11 rows 2R 2x10 #1 seated on box  Biceps, rows 1R1B  Ext 1R     #1 seated on box  Biceps, rows 1R1B  Ext 1R                                 Thoracic Mobility #6 3B 15x  Further extension #2 1R 10x on reformer #6 wunda assisted thoracic mobility - not to full extension #6 3B 10x #6 3B 10x   #6 3B 10x #2 1R 10x on reformer                                 General ROM Hamstring 1 min #7 1 min #9 1 min #9 1 min #9 1 min with ER #9 1 min 1 min    #10 post capsule    #10 post capsule #10 post capsule  1 min    Psoas 1R 1 min   Did not feel stretch as well       mermaid             Other  #1 posterior pelvic tilt 10x  #2 SB knee to chest 15x #1 posterior pelvic tilt 10x  #2 SB knee to chest 15x #1 posterior pelvic tilt 10x  #2 SB knee to chest 15x #1 posterior pelvic tilt 10x  #2 SB knee to chest 15x #1 posterior pelvic tilt 10x  #2 SB knee to chest 15x                                  Summary/Comments HEP: bridges, laying flat hamstring stretches  Continue to progress Access Code: [email or web address]  URL: [email or web address] com/  Date: 07/27/2021  Prepared by: Norbert Doss    Exercises  Supine Posterior Pelvic Tilt - 1 x daily - 7 x weekly - 2 sets - 10 reps  Supine Hip and Knee Flexion AROM with Swiss Ball - 1 x daily - 7 x weekly - 2 sets - 10 reps  Seated Thoracic Extension AROM - 1 x daily - 7 x weekly - 1 sets - 10 reps  Cat-Camel - 1 x daily - 7 x weekly - 1 sets - 10 reps                                         Electronically signed by:  Norbert Doss

## 2022-03-22 ENCOUNTER — HOSPITAL ENCOUNTER (OUTPATIENT)
Dept: PHYSICAL THERAPY | Age: 62
Discharge: HOME | End: 2022-03-22

## 2022-03-22 NOTE — FLOWSHEET NOTE
[facility], [address]  Phone: [phone]  Fax [phone]      Date:  3/22/2022    Patient Name:  Priscilla Arthur    :  1960  MRN: [id number]  Restrictions/Precautions:    Medical/Treatment Diagnosis Information:  ·  none - general strengthening     Physician Information:   none    Patient is Post-Op [] Yes   [] No     DOS:      Charge Package $210: 21, 3/8/22     3/8/22 3/15/22 3/22/22       Subjective Doing well Doing well Doing well                            1/7 2/7 3/7 4/7 5/7 6/7 7/7   Objective Hamstring 90  Only 1 pillow today HEP Sharon Chalet  [id number]   Starting with upper body work and then reformer. Goals 1.  Decrease back pain, TA strengthening                   Reformer Exercises    Lumbar Roll    3 folded towels under head with headrest up #7  15x 2R1G        15x        15x/10sec stretch        U squat 2R1B 20x #3  15x 2R1G        15x        15x/10sec stretch        U squat 2R1B 20x #7  2x10 2R1G        2x10        2x10        U squat 2R1B 15x   #7  2x10 2R1G        2x10        2x10        U squat 2R1B 15x #7  25x 2R1G, ER        2x10        2x10/10sec stretch        U squat 2R1B 20x #7  25x 2R1G, ER        2x10        2x10/10sec stretch        U squat 2R1B 20x #3  15x 2R1G        15x        15x/10sec stretch        U squat 2R1B 20x   Pelvic Stabilization   #3 2x10 #4 Bridges 2R1B on reformer #4 2x10 #3 2x10  No springs #3 2x10 #3 2x10 #4 2R1B on reformer    #12 Standing 1R1Y 2x10 8# 2x10 #8 15x #10 15x #11 2x10 #11 2x10 #7 2x10                       Trunk Stabilization #5 10x3 marches at a time    4x 8 sec with lat pull #6 1R 15x  #6 10x3 marches at a time    3x 8 sec with lat pull #5 15x #5 10x3 marches at a time    Tabletop position practice 4x 8sec #5 lat pulls short yellow     4x 8sec #6 1R 15x          1R tabletop on reformer                        Hip Disassociation #8 2R 2x10 5# 2R 2x10 #5 2x10

## 2022-03-29 ENCOUNTER — HOSPITAL ENCOUNTER (OUTPATIENT)
Dept: PHYSICAL THERAPY | Age: 62
Discharge: HOME | End: 2022-03-29

## 2022-03-29 NOTE — FLOWSHEET NOTE
[facility], [address]  Phone: [phone]  Fax [phone]      Date:  3/29/2022    Patient Name:  Urbano Gracia    :  1960  MRN: [id number]  Restrictions/Precautions:    Medical/Treatment Diagnosis Information:  ·  none - general strengthening     Physician Information:   none    Patient is Post-Op [] Yes   [] No     DOS:      Charge Package $210: 21, 3/8/22     3/8/22 3/15/22 3/22/22 3/29/22      Subjective Doing well Doing well Doing well Doing well                           1/7 2/7 3/7 4/7 5/7 6/7 7/7   Objective Hamstring 90  Only 1 pillow today HEP [address] [place], [place] [id number]   Starting with upper body work and then reformer. Goals 1.  Decrease back pain, TA strengthening                   Reformer Exercises    Lumbar Roll    3 folded towels under head with headrest up #7  15x 2R1G        15x        15x/10sec stretch        U squat 2R1B 20x #3  15x 2R1G        15x        15x/10sec stretch        U squat 2R1B 20x #7  2x10 2R1G        2x10        2x10        U squat 2R1B 15x   #3  2x10 2R1G        2x10        2x10        U squat 2R1B 15x #7  25x 2R1G, ER        2x10        2x10/10sec stretch        U squat 2R1B 20x #7  25x 2R1G, ER        2x10        2x10/10sec stretch        U squat 2R1B 20x #3  15x 2R1G        15x        15x/10sec stretch        U squat 2R1B 20x   Pelvic Stabilization   #3 2x10 #4 Bridges 2R1B on reformer #4 2x10 #3 2x10  No springs #3 2x10 #3 2x10 #4 2R1B on reformer    #12 Standing 1R1Y 2x10 8# 2x10 #8 15x #8 15x #11 2x10 #11 2x10 #7 2x10                       Trunk Stabilization #5 10x3 marches at a time    4x 8 sec with lat pull #6 1R 15x   #6 15x #5 10x3 marches at a time    Tabletop position practice 4x 8sec #5 lat pulls short yellow     4x 8sec #6 1R 15x          1R tabletop on reformer                        Hip Disassociation #8 2R 2x10 5# 2R 2x10 #5 2x10 2R #5 2x10 2R1B #8 2x10 2R1B #8 2x10 2R   #5 2x10 2R     Arcs, V's circles         #4 Trap table marches and biking long yellow spring around thigh 12x #4 Trap table marches and biking long yellow spring around thigh 12x #4 Trap table marches and biking long yellow spring around thigh 12x  #               Scapular Stabilization #11 rows 2R 2x10 #1 seated on box  Biceps, rows 1R1B  Ext 1R #1 seated on box  Biceps, rows 1R1B  Ext 1R #1 seated on box  Biceps, rows 1R1B  Ext 1R   #1 seated on box  Biceps, rows 1R1B  Ext 1R                                 Thoracic Mobility #6 3B 15x  Further extension #2 1R 10x on reformer #2 1R 10x on reformer #2 10x on reformer #6 3B 10x   #6 3B 10x #2 1R 10x on reformer                                 General ROM Hamstring 1 min #7 1 min #7 1 min #7 1 min #9 1 min with ER #9 1 min 1 min    #10 post capsule    #10 post capsule #10 post capsule  1 min    Psoas 1R 1 min   Did not feel stretch as well       mermaid             Other  #1 posterior pelvic tilt 10x  #2 SB knee to chest 15x   #1 posterior pelvic tilt 10x  #2 SB knee to chest 15x #1 posterior pelvic tilt 10x  #2 SB knee to chest 15x                                  Summary/Comments HEP: bridges, laying flat hamstring stretches  Continue to progress Access Code: [email or web address]  URL: [email or web address]/  Date: 07/27/2021  Prepared by: Tresa Ordoñez    Exercises  Supine Posterior Pelvic Tilt - 1 x daily - 7 x weekly - 2 sets - 10 reps  Supine Hip and Knee Flexion AROM with Swiss Ball - 1 x daily - 7 x weekly - 2 sets - 10 reps  Seated Thoracic Extension AROM - 1 x daily - 7 x weekly - 1 sets - 10 reps  Cat-Camel - 1 x daily - 7 x weekly - 1 sets - 10 reps                                         Electronically signed by:  Tresa Ordoñez

## 2022-04-12 ENCOUNTER — HOSPITAL ENCOUNTER (OUTPATIENT)
Dept: PHYSICAL THERAPY | Age: 62
Discharge: HOME | End: 2022-04-12

## 2022-04-12 NOTE — FLOWSHEET NOTE
[address]  Phone: [phone]  Fax [phone]      Date:  2022    Patient Name:  Riddhi Hilliard    :  1960  MRN: [id number]  Restrictions/Precautions:    Medical/Treatment Diagnosis Information:  ·  none - general strengthening     Physician Information:   none    Patient is Post-Op [] Yes   [] No     DOS:      Charge Package $210: 21, 3/8/22     3/8/22 3/15/22 3/22/22 3/29/22 4/12/22     Subjective Doing well Doing well Doing well Doing well Doing well                           $12 visit  $12 visit    Objective Hamstring 90  Only 1 pillow today HEP Frederick Brian  [id number]   Starting with upper body work and then reformer. Goals 1.  Decrease back pain, TA strengthening                   Reformer Exercises    Lumbar Roll    3 folded towels under head with headrest up #7  15x 2R1G        15x        15x/10sec stretch        U squat 2R1B 20x #3  15x 2R1G        15x        15x/10sec stretch        U squat 2R1B 20x #7  2x10 2R1G        2x10        2x10        U squat 2R1B 15x   #3  2x10 2R1G        2x10        2x10        U squat 2R1B 15x #7  25x 2R1G, ER        2x10        2x10/10sec stretch        U squat 2R1B 20x #7  25x 2R1G, ER        2x10        2x10/10sec stretch        U squat 2R1B 20x #3  15x 2R1G        15x        15x/10sec stretch        U squat 2R1B 20x   Pelvic Stabilization   #3 2x10 #4 Bridges 2R1B on reformer #4 2x10 #3 2x10  No springs #3 2x10 #3 2x10 #4 2R1B on reformer    #12 Standing 1R1Y 2x10 8# 2x10 #8 15x #8 15x #11 2x10 #11 2x10 #7 2x10                       Trunk Stabilization #5 10x3 marches at a time    4x 8 sec with lat pull #6 1R 15x   #6 15x #5 10x3 marches at a time    Tabletop position practice 4x 8sec #5 lat pulls short yellow     4x 8sec #6 1R 15x          1R tabletop on reformer                        Hip Disassociation #8 2R 2x10 5# 2R 2x10 #5 2x10 2R #5 2x10 2R1B #8 2x10 2R1B #8 2x10 2R   #5 2x10 2R     Arcs, V's circles         #4 Trap table marches and biking long yellow spring around thigh 12x #4 Trap table marches and biking long yellow spring around thigh 12x #4 Trap table marches and biking long yellow spring around thigh 12x  #               Scapular Stabilization #11 rows 2R 2x10 #1 seated on box  Biceps, rows 1R1B  Ext 1R #1 seated on box  Biceps, rows 1R1B  Ext 1R #1 seated on box  Biceps, rows 1R1B  Ext 1R   #1 seated on box  Biceps, rows 1R1B  Ext 1R                                 Thoracic Mobility #6 3B 15x  Further extension #2 1R 10x on reformer #2 1R 10x on reformer #2 10x on reformer #6 3B 10x   #6 3B 10x #2 1R 10x on reformer                                 General ROM Hamstring 1 min #7 1 min #7 1 min #7 1 min #9 1 min with ER #9 1 min 1 min    #10 post capsule    #10 post capsule #10 post capsule  1 min    Psoas 1R 1 min   Did not feel stretch as well       mermaid             Other  #1 posterior pelvic tilt 10x  #2 SB knee to chest 15x   #1 posterior pelvic tilt 10x  #2 SB knee to chest 15x #1 posterior pelvic tilt 10x  #2 SB knee to chest 15x                                  Summary/Comments HEP: bridges, laying flat hamstring stretches  Continue to progress Access Code: [email or web address]  URL: [email or web address] com/  Date: 07/27/2021  Prepared by: Sarah Ray    Exercises  Supine Posterior Pelvic Tilt - 1 x daily - 7 x weekly - 2 sets - 10 reps  Supine Hip and Knee Flexion AROM with Swiss Ball - 1 x daily - 7 x weekly - 2 sets - 10 reps  Seated Thoracic Extension AROM - 1 x daily - 7 x weekly - 1 sets - 10 reps  Cat-Camel - 1 x daily - 7 x weekly - 1 sets - 10 reps                                         Electronically signed by:  Sarah Ray

## 2022-04-19 ENCOUNTER — HOSPITAL ENCOUNTER (OUTPATIENT)
Dept: PHYSICAL THERAPY | Age: 62
Discharge: HOME | End: 2022-04-19

## 2022-04-19 NOTE — FLOWSHEET NOTE
[facility], [address]  Phone: [phone]  Fax [phone]      Date:  2022    Patient Name:  Faina Beasley    :  1960  MRN: [id number]  Restrictions/Precautions:    Medical/Treatment Diagnosis Information:  ·  none - general strengthening     Physician Information:   none    Patient is Post-Op [] Yes   [] No     DOS:      Charge Package $210: 21, 3/8/22     3/8/22 3/15/22 3/22/22 3/29/22 4/12/22 4/19/22    Subjective Doing well Doing well Doing well Doing well Doing well Doing well                          $12 visit  $12 visit    Objective Hamstring 90  Only 1 pillow today HEP [address] [place] [address]  [id number]   Starting with upper body work and then reformer. Goals 1.  Decrease back pain, TA strengthening                   Reformer Exercises    Lumbar Roll    3 folded towels under head with headrest up #7  15x 2R1G        15x        15x/10sec stretch        U squat 2R1B 20x #3  15x 2R1G        15x        15x/10sec stretch        U squat 2R1B 20x #7  2x10 2R1G        2x10        2x10        U squat 2R1B 15x   #3  2x10 2R1G        2x10        2x10        U squat 2R1B 15x #7  25x 2R1G, ER        2x10        2x10/10sec stretch        U squat 2R1B 20x #7  25x 2R1G, ER        2x10        2x10/10sec stretch        U squat 2R1B 20x #3  15x 2R1G        15x        15x/10sec stretch        U squat 2R1B 20x   Pelvic Stabilization   #3 2x10 #4 Bridges 2R1B on reformer #4 2x10 #3 2x10  No springs #3 2x10 #3 2x10 #4 2R1B on reformer    #12 Standing 1R1Y 2x10 8# 2x10 #8 15x #8 15x #11 2x10 #11 2x10 #7 2x10                       Trunk Stabilization #5 10x3 marches at a time    4x 8 sec with lat pull #6 1R 15x   #6 15x #5 10x3 marches at a time    Tabletop position practice 4x 8sec #5 lat pulls short yellow     4x 8sec #6 1R 15x          1R tabletop on reformer                        Hip Disassociation #8 2R 2x10 5# 2R 2x10 #5 2x10 2R #5 2x10 2R1B #8 2x10 2R1B #8 2x10 2R   #5 2x10 2R     Arcs, V's circles         #4 Trap table marches and biking long yellow spring around thigh 12x #4 Trap table marches and biking long yellow spring around thigh 12x #4 Trap table marches and biking long yellow spring around thigh 12x  #               Scapular Stabilization #11 rows 2R 2x10 #1 seated on box  Biceps, rows 1R1B  Ext 1R #1 seated on box  Biceps, rows 1R1B  Ext 1R #1 seated on box  Biceps, rows 1R1B  Ext 1R   #1 seated on box  Biceps, rows 1R1B  Ext 1R                                 Thoracic Mobility #6 3B 15x  Further extension #2 1R 10x on reformer #2 1R 10x on reformer #2 10x on reformer #6 3B 10x   #6 3B 10x #2 1R 10x on reformer                                 General ROM Hamstring 1 min #7 1 min #7 1 min #7 1 min #9 1 min with ER #9 1 min 1 min    #10 post capsule    #10 post capsule #10 post capsule  1 min    Psoas 1R 1 min   Did not feel stretch as well       mermaid             Other  #1 posterior pelvic tilt 10x  #2 SB knee to chest 15x   #1 posterior pelvic tilt 10x  #2 SB knee to chest 15x #1 posterior pelvic tilt 10x  #2 SB knee to chest 15x                                  Summary/Comments HEP: bridges, laying flat hamstring stretches  Continue to progress Access Code: [email or web address]  URL: [email or web address]/  Date: 07/27/2021  Prepared by: Susannah Lim    Exercises  Supine Posterior Pelvic Tilt - 1 x daily - 7 x weekly - 2 sets - 10 reps  Supine Hip and Knee Flexion AROM with Swiss Ball - 1 x daily - 7 x weekly - 2 sets - 10 reps  Seated Thoracic Extension AROM - 1 x daily - 7 x weekly - 1 sets - 10 reps  Cat-Camel - 1 x daily - 7 x weekly - 1 sets - 10 reps                                         Electronically signed by:  Susannah Lim

## 2022-04-26 ENCOUNTER — HOSPITAL ENCOUNTER (OUTPATIENT)
Dept: PHYSICAL THERAPY | Age: 62
Discharge: HOME | End: 2022-04-26

## 2022-04-26 NOTE — FLOWSHEET NOTE
[facility], [address]  Phone: [phone]  Fax [phone]      Date:  2022    Patient Name:  Jono Collazo    :  1960  MRN: [id number]  Restrictions/Precautions:    Medical/Treatment Diagnosis Information:  ·  none - general strengthening     Physician Information:   none    Patient is Post-Op [] Yes   [] No     DOS:      Charge Package $210: 21, 3/8/22     3/8/22 3/15/22 3/22/22 3/29/22 4/12/22 4/19/22 4/26/22   Subjective Doing well Doing well Doing well Doing well Doing well Doing well Doing well                         $12 visit  $12 visit    Objective Hamstring 90  Only 1 pillow today HEP [address] [place], [place] [address]   Starting with upper body work and then reformer. Goals 1.  Decrease back pain, TA strengthening                   Reformer Exercises    Lumbar Roll    3 folded towels under head with headrest up #7  15x 2R1G        15x        15x/10sec stretch        U squat 2R1B 20x #3  15x 2R1G        15x        15x/10sec stretch        U squat 2R1B 20x #7  2x10 2R1G        2x10        2x10        U squat 2R1B 15x   #3  2x10 2R1G        2x10        2x10        U squat 2R1B 15x #7  25x 2R1G, ER        2x10        2x10/10sec stretch        U squat 2R1B 20x #7  25x 2R1G, ER        2x10        2x10/10sec stretch        U squat 2R1B 20x #3  15x 2R1G        15x        15x/10sec stretch        U squat 2R1B 20x   Pelvic Stabilization   #3 2x10 #4 Bridges 2R1B on reformer #4 2x10 #3 2x10  No springs #3 2x10 #3 2x10 #4 2R1B on reformer    #12 Standing 1R1Y 2x10 8# 2x10 #8 15x #8 15x #11 2x10 #11 2x10 #7 2x10                       Trunk Stabilization #5 10x3 marches at a time    4x 8 sec with lat pull #6 1R 15x   #6 15x #5 10x3 marches at a time    Tabletop position practice 4x 8sec #5 lat pulls short yellow     4x 8sec #6 1R 15x          1R tabletop on reformer                        Hip Disassociation #8 2R 2x10 5# 2R 2x10 #5 2x10 2R #5 2x10 2R1B #8 2x10 2R1B #8 2x10 2R   #5 2x10 2R     Arcs, V's circles         #4 Trap table marches and biking long yellow spring around thigh 12x #4 Trap table marches and biking long yellow spring around thigh 12x #4 Trap table marches and biking long yellow spring around thigh 12x  #               Scapular Stabilization #11 rows 2R 2x10 #1 seated on box  Biceps, rows 1R1B  Ext 1R #1 seated on box  Biceps, rows 1R1B  Ext 1R #1 seated on box  Biceps, rows 1R1B  Ext 1R   #1 seated on box  Biceps, rows 1R1B  Ext 1R                                 Thoracic Mobility #6 3B 15x  Further extension #2 1R 10x on reformer #2 1R 10x on reformer #2 10x on reformer #6 3B 10x   #6 3B 10x #2 1R 10x on reformer                                 General ROM Hamstring 1 min #7 1 min #7 1 min #7 1 min #9 1 min with ER #9 1 min 1 min    #10 post capsule    #10 post capsule #10 post capsule  1 min    Psoas 1R 1 min   Did not feel stretch as well       mermaid             Other  #1 posterior pelvic tilt 10x  #2 SB knee to chest 15x   #1 posterior pelvic tilt 10x  #2 SB knee to chest 15x #1 posterior pelvic tilt 10x  #2 SB knee to chest 15x                                  Summary/Comments HEP: bridges, laying flat hamstring stretches  Continue to progress Access Code: [email or web address]  URL: [email or web address]  Date: 07/27/2021  Prepared by: Criss Bergman    Exercises  Supine Posterior Pelvic Tilt - 1 x daily - 7 x weekly - 2 sets - 10 reps  Supine Hip and Knee Flexion AROM with Swiss Ball - 1 x daily - 7 x weekly - 2 sets - 10 reps  Seated Thoracic Extension AROM - 1 x daily - 7 x weekly - 1 sets - 10 reps  Cat-Camel - 1 x daily - 7 x weekly - 1 sets - 10 reps                                         Electronically signed by:  Criss Bergman

## 2022-05-13 ENCOUNTER — HOSPITAL ENCOUNTER (OUTPATIENT)
Dept: PHYSICAL THERAPY | Age: 62
Discharge: HOME | End: 2022-05-13

## 2022-05-13 NOTE — FLOWSHEET NOTE
[facility], [address]  Phone: [phone]  Fax [phone]      Date:  2022    Patient Name:  Jeanna Lyons    :  1960  MRN: [id number]  Restrictions/Precautions:    Medical/Treatment Diagnosis Information:  ·  none - general strengthening     Physician Information:   none    Patient is Post-Op [] Yes   [] No     DOS:      Charge Package $210: 21, 3/8/22     5/13/22         Subjective Doing well                               1 $12 visit  $12 visit    Objective Hamstring 90  Only 1 pillow today HEP [address] [place] [address]   Starting with upper body work and then reformer. Goals 1.  Decrease back pain, TA strengthening                   Reformer Exercises    Lumbar Roll    3 folded towels under head with headrest up #7  15x 2R1G        15x        15x/10sec stretch        U squat 2R1B 20x #3  15x 2R1G        15x        15x/10sec stretch        U squat 2R1B 20x #7  2x10 2R1G        2x10        2x10        U squat 2R1B 15x   #3  2x10 2R1G        2x10        2x10        U squat 2R1B 15x #7  25x 2R1G, ER        2x10        2x10/10sec stretch        U squat 2R1B 20x #7  25x 2R1G, ER        2x10        2x10/10sec stretch        U squat 2R1B 20x #3  15x 2R1G        15x        15x/10sec stretch        U squat 2R1B 20x   Pelvic Stabilization   #3 2x10 #4 Bridges 2R1B on reformer #4 2x10 #3 2x10  No springs #3 2x10 #3 2x10 #4 2R1B on reformer    #12 Standing 1R1Y 2x10 8# 2x10 #8 15x #8 15x #11 2x10 #11 2x10 #7 2x10                       Trunk Stabilization #5 10x3 marches at a time    4x 8 sec with lat pull #6 1R 15x   #6 15x #5 10x3 marches at a time    Tabletop position practice 4x 8sec #5 lat pulls short yellow     4x 8sec #6 1R 15x          1R tabletop on reformer                        Hip Disassociation #8 2R 2x10 5# 2R 2x10 #5 2x10 2R #5 2x10 2R1B #8 2x10 2R1B #8 2x10 2R   #5 2x10 2R     Arcs, V's circles         #4 Trap table marches and biking long yellow spring around thigh 12x #4 Trap table marches and biking long yellow spring around thigh 12x #4 Trap table marches and biking long yellow spring around thigh 12x  #               Scapular Stabilization #11 rows 2R 2x10 #1 seated on box  Biceps, rows 1R1B  Ext 1R #1 seated on box  Biceps, rows 1R1B  Ext 1R #1 seated on box  Biceps, rows 1R1B  Ext 1R   #1 seated on box  Biceps, rows 1R1B  Ext 1R                                 Thoracic Mobility #6 3B 15x  Further extension #2 1R 10x on reformer #2 1R 10x on reformer #2 10x on reformer #6 3B 10x   #6 3B 10x #2 1R 10x on reformer                                 General ROM Hamstring 1 min #7 1 min #7 1 min #7 1 min #9 1 min with ER #9 1 min 1 min    #10 post capsule    #10 post capsule #10 post capsule  1 min    Psoas 1R 1 min   Did not feel stretch as well       mermaid             Other  #1 posterior pelvic tilt 10x  #2 SB knee to chest 15x   #1 posterior pelvic tilt 10x  #2 SB knee to chest 15x #1 posterior pelvic tilt 10x  #2 SB knee to chest 15x                                  Summary/Comments HEP: bridges, laying flat hamstring stretches  Continue to progress Access Code: [email or web address]  URL: [email or web address]  Date: 07/27/2021  Prepared by: Iker Child    Exercises  Supine Posterior Pelvic Tilt - 1 x daily - 7 x weekly - 2 sets - 10 reps  Supine Hip and Knee Flexion AROM with Swiss Ball - 1 x daily - 7 x weekly - 2 sets - 10 reps  Seated Thoracic Extension AROM - 1 x daily - 7 x weekly - 1 sets - 10 reps  Cat-Camel - 1 x daily - 7 x weekly - 1 sets - 10 reps                                         Electronically signed by:  Iker Child

## 2022-05-17 ENCOUNTER — HOSPITAL ENCOUNTER (OUTPATIENT)
Dept: PHYSICAL THERAPY | Age: 62
Discharge: HOME | End: 2022-05-17

## 2022-05-17 NOTE — FLOWSHEET NOTE
[facility], [address]  Phone: [phone]  Fax [phone]      Date:  2022    Patient Name:  Curry Jose    :  1960  MRN: [id number]  Restrictions/Precautions:    Medical/Treatment Diagnosis Information:  ·  none - general strengthening     Physician Information:   none    Patient is Post-Op [] Yes   [] No     DOS:      Charge Package $210: 21, 3/8/22     5/13/22 5/17/22        Subjective Doing well Doing well                              $12 visit  $12 visit    Objective Hamstring 90  Only 1 pillow today  [address]   Starting with upper body work and then reformer. Goals 1.  Decrease back pain, TA strengthening                   Reformer Exercises    Lumbar Roll    3 folded towels under head with headrest up #7  15x 2R1G        15x        15x/10sec stretch        U squat 2R1B 20x #3  15x 2R1G        15x        15x/10sec stretch        U squat 2R1B 20x #7  2x10 2R1G        2x10        2x10        U squat 2R1B 15x   #3  2x10 2R1G        2x10        2x10        U squat 2R1B 15x #7  25x 2R1G, ER        2x10        2x10/10sec stretch        U squat 2R1B 20x #7  25x 2R1G, ER        2x10        2x10/10sec stretch        U squat 2R1B 20x #3  15x 2R1G        15x        15x/10sec stretch        U squat 2R1B 20x   Pelvic Stabilization   #3 2x10 #4 Bridges 2R1B on reformer #4 2x10 #3 2x10  No springs #3 2x10 #3 2x10 #4 2R1B on reformer    #12 Standing 1R1Y 2x10 8# 2x10 #8 15x #8 15x #11 2x10 #11 2x10 #7 2x10                       Trunk Stabilization #5 10x3 marches at a time    4x 8 sec with lat pull #6 1R 15x   #6 15x #5 10x3 marches at a time    Tabletop position practice 4x 8sec #5 lat pulls short yellow     4x 8sec #6 1R 15x          1R tabletop on reformer                        Hip Disassociation #8 2R 2x10 5# 2R 2x10 #5 2x10 2R #5 2x10 2R1B #8 2x10 2R1B #8 2x10 2R   #5 2x10 2R     Arcs, V's circles         #4 Trap table marches and biking long yellow spring around thigh 12x #4 Trap table marches and biking long yellow spring around thigh 12x #4 Trap table marches and biking long yellow spring around thigh 12x  #               Scapular Stabilization #11 rows 2R 2x10 #1 seated on box  Biceps, rows 1R1B  Ext 1R #1 seated on box  Biceps, rows 1R1B  Ext 1R #1 seated on box  Biceps, rows 1R1B  Ext 1R   #1 seated on box  Biceps, rows 1R1B  Ext 1R                                 Thoracic Mobility #6 3B 15x  Further extension #2 1R 10x on reformer #2 1R 10x on reformer #2 10x on reformer #6 3B 10x   #6 3B 10x #2 1R 10x on reformer                                 General ROM Hamstring 1 min #7 1 min #7 1 min #7 1 min #9 1 min with ER #9 1 min 1 min    #10 post capsule    #10 post capsule #10 post capsule  1 min    Psoas 1R 1 min   Did not feel stretch as well       mermaid             Other  #1 posterior pelvic tilt 10x  #2 SB knee to chest 15x   #1 posterior pelvic tilt 10x  #2 SB knee to chest 15x #1 posterior pelvic tilt 10x  #2 SB knee to chest 15x                                  Summary/Comments HEP: bridges, laying flat hamstring stretches  Continue to progress Access Code: [email or web address]  URL: [email or web address]. [email or web address]/  Date: 07/27/2021  Prepared by: Laisha Whitt    Exercises  Supine Posterior Pelvic Tilt - 1 x daily - 7 x weekly - 2 sets - 10 reps  Supine Hip and Knee Flexion AROM with Swiss Ball - 1 x daily - 7 x weekly - 2 sets - 10 reps  Seated Thoracic Extension AROM - 1 x daily - 7 x weekly - 1 sets - 10 reps  Cat-Camel - 1 x daily - 7 x weekly - 1 sets - 10 reps                                         Electronically signed by:  Laisha Whitt

## 2022-05-24 ENCOUNTER — HOSPITAL ENCOUNTER (OUTPATIENT)
Dept: PHYSICAL THERAPY | Age: 62
Discharge: HOME | End: 2022-05-24

## 2022-05-24 NOTE — FLOWSHEET NOTE
[facility], [address]  Phone: [phone]  Fax [phone]      Date:  2022    Patient Name:  Rusty Huang    :  1960  MRN: [id number]  Restrictions/Precautions:    Medical/Treatment Diagnosis Information:  ·  none - general strengthening     Physician Information:   none    Patient is Post-Op [] Yes   [] No     DOS:      Charge Package $210: 21, 3/8/22     5/13/22 5/17/22 5/24/22       Subjective Doing well Doing well Doing well                             $12 visit  $12 visit    Objective Hamstring 90  Only 1 pillow today HEP [address] [place], [place] [address]   Starting with upper body work and then reformer. Goals 1.  Decrease back pain, TA strengthening                   Reformer Exercises    Lumbar Roll    3 folded towels under head with headrest up #7  15x 2R1G        15x        15x/10sec stretch        U squat 2R1B 20x #3  15x 2R1G        15x        15x/10sec stretch        U squat 2R1B 20x #7  2x10 2R1G        2x10        2x10        U squat 2R1B 15x   #3  2x10 2R1G        2x10        2x10        U squat 2R1B 15x #7  25x 2R1G, ER        2x10        2x10/10sec stretch        U squat 2R1B 20x #7  25x 2R1G, ER        2x10        2x10/10sec stretch        U squat 2R1B 20x #3  15x 2R1G        15x        15x/10sec stretch        U squat 2R1B 20x   Pelvic Stabilization   #3 2x10 #4 Bridges 2R1B on reformer #4 2x10 #3 2x10  No springs #3 2x10 #3 2x10 #4 2R1B on reformer    #12 Standing 1R1Y 2x10 8# 2x10 #8 15x #8 15x #11 2x10 #11 2x10 #7 2x10                       Trunk Stabilization #5 10x3 marches at a time    4x 8 sec with lat pull #6 1R 15x   #6 15x #5 10x3 marches at a time    Tabletop position practice 4x 8sec #5 lat pulls short yellow     4x 8sec #6 1R 15x          1R tabletop on reformer                        Hip Disassociation #8 2R 2x10 5# 2R 2x10 #5 2x10 2R #5 2x10 2R1B #8 2x10 2R1B #8 2x10 2R   #5 2x10 2R     Arcs, V's circles         #4 Trap table marches and biking long yellow spring around thigh 12x #4 Trap table marches and biking long yellow spring around thigh 12x #4 Trap table marches and biking long yellow spring around thigh 12x  #               Scapular Stabilization #11 rows 2R 2x10 #1 seated on box  Biceps, rows 1R1B  Ext 1R #1 seated on box  Biceps, rows 1R1B  Ext 1R #1 seated on box  Biceps, rows 1R1B  Ext 1R   #1 seated on box  Biceps, rows 1R1B  Ext 1R                                 Thoracic Mobility #6 3B 15x  Further extension #2 1R 10x on reformer #2 1R 10x on reformer #2 10x on reformer #6 3B 10x   #6 3B 10x #2 1R 10x on reformer                                 General ROM Hamstring 1 min #7 1 min #7 1 min #7 1 min #9 1 min with ER #9 1 min 1 min    #10 post capsule    #10 post capsule #10 post capsule  1 min    Psoas 1R 1 min   Did not feel stretch as well       mermaid             Other  #1 posterior pelvic tilt 10x  #2 SB knee to chest 15x   #1 posterior pelvic tilt 10x  #2 SB knee to chest 15x #1 posterior pelvic tilt 10x  #2 SB knee to chest 15x                                  Summary/Comments HEP: bridges, laying flat hamstring stretches  Continue to progress Access Code: [email or web address]  URL: [email or web address]  Date: 07/27/2021  Prepared by: Fiorella Pineda    Exercises  Supine Posterior Pelvic Tilt - 1 x daily - 7 x weekly - 2 sets - 10 reps  Supine Hip and Knee Flexion AROM with Swiss Ball - 1 x daily - 7 x weekly - 2 sets - 10 reps  Seated Thoracic Extension AROM - 1 x daily - 7 x weekly - 1 sets - 10 reps  Cat-Camel - 1 x daily - 7 x weekly - 1 sets - 10 reps                                         Electronically signed by:  Fiorella Pineda

## 2022-06-07 ENCOUNTER — HOSPITAL ENCOUNTER (OUTPATIENT)
Dept: PHYSICAL THERAPY | Age: 62
Discharge: HOME | End: 2022-06-07

## 2022-06-07 NOTE — FLOWSHEET NOTE
[facility], [address]  Phone: [phone]  Fax [phone]      Date:  2022    Patient Name:  Dejuan Banda    :  1960  MRN: [id number]  Restrictions/Precautions:    Medical/Treatment Diagnosis Information:  ·  none - general strengthening     Physician Information:   none    Patient is Post-Op [] Yes   [] No     DOS:      Charge Package $210: 21, 3/8/22     5/13/22 5/17/22 5/24/22 6/7/22      Subjective Doing well Doing well Doing well Doing well                            $12 visit  $12 visit [facility] for a package    Objective Hamstring 90  Only 1 pillow today HEP Terrye Curl  [id number]   Starting with upper body work and then reformer. Goals 1.  Decrease back pain, TA strengthening                   Reformer Exercises    Lumbar Roll    3 folded towels under head with headrest up #7  15x 2R1G        15x        15x/10sec stretch        U squat 2R1B 20x #3  15x 2R1G        15x        15x/10sec stretch        U squat 2R1B 20x #7  2x10 2R1G        2x10        2x10        U squat 2R1B 15x   #3  2x10 2R1G        2x10        2x10        U squat 2R1B 15x #7  25x 2R1G, ER        2x10        2x10/10sec stretch        U squat 2R1B 20x #7  25x 2R1G, ER        2x10        2x10/10sec stretch        U squat 2R1B 20x #3  15x 2R1G        15x        15x/10sec stretch        U squat 2R1B 20x   Pelvic Stabilization   #3 2x10 #4 Bridges 2R1B on reformer #4 2x10 #3 2x10  No springs #3 2x10 #3 2x10 #4 2R1B on reformer    #12 Standing 1R1Y 2x10 8# 2x10 #8 15x #8 15x #11 2x10 #11 2x10 #7 2x10                       Trunk Stabilization #5 10x3 marches at a time    4x 8 sec with lat pull #6 1R 15x   #6 15x #5 10x3 marches at a time    Tabletop position practice 4x 8sec #5 lat pulls short yellow     4x 8sec #6 1R 15x          1R tabletop on reformer                        Hip Disassociation #8 2R 2x10 5# 2R 2x10 #5 2x10 2R #5 2x10 2R1B #8 2x10 2R1B #8 2x10 2R   #5 2x10 2R     Arcs, V's circles         #4 Trap table marches and biking long yellow spring around thigh 12x #4 Trap table marches and biking long yellow spring around thigh 12x #4 Trap table marches and biking long yellow spring around thigh 12x  #               Scapular Stabilization #11 rows 2R 2x10 #1 seated on box  Biceps, rows 1R1B  Ext 1R #1 seated on box  Biceps, rows 1R1B  Ext 1R #1 seated on box  Biceps, rows 1R1B  Ext 1R   #1 seated on box  Biceps, rows 1R1B  Ext 1R                                 Thoracic Mobility #6 3B 15x  Further extension #2 1R 10x on reformer #2 1R 10x on reformer #2 10x on reformer #6 3B 10x   #6 3B 10x #2 1R 10x on reformer                                 General ROM Hamstring 1 min #7 1 min #7 1 min #7 1 min #9 1 min with ER #9 1 min 1 min    #10 post capsule    #10 post capsule #10 post capsule  1 min    Psoas 1R 1 min   Did not feel stretch as well       mermaid             Other  #1 posterior pelvic tilt 10x  #2 SB knee to chest 15x   #1 posterior pelvic tilt 10x  #2 SB knee to chest 15x #1 posterior pelvic tilt 10x  #2 SB knee to chest 15x                                  Summary/Comments HEP: bridges, laying flat hamstring stretches  Continue to progress Access Code: [email or web address]  URL: [email or web address]. com/  Date: 07/27/2021  Prepared by: Loc Byrd    Exercises  Supine Posterior Pelvic Tilt - 1 x daily - 7 x weekly - 2 sets - 10 reps  Supine Hip and Knee Flexion AROM with Swiss Ball - 1 x daily - 7 x weekly - 2 sets - 10 reps  Seated Thoracic Extension AROM - 1 x daily - 7 x weekly - 1 sets - 10 reps  Cat-Camel - 1 x daily - 7 x weekly - 1 sets - 10 reps                                         Electronically signed by:  Loc Byrd

## 2022-06-21 ENCOUNTER — HOSPITAL ENCOUNTER (OUTPATIENT)
Dept: PHYSICAL THERAPY | Age: 62
Discharge: HOME | End: 2022-06-21

## 2022-06-21 PROCEDURE — 9990000029 HC GAP PACKAGE: Performed by: SPECIALIST/TECHNOLOGIST

## 2022-06-21 NOTE — FLOWSHEET NOTE
[facility], [address]  Phone: [phone]  Fax [phone]      Date:  2022    Patient Name:  Dileep Crespo    :  1960  MRN: [id number]  Restrictions/Precautions:    Medical/Treatment Diagnosis Information:  ·  none - general strengthening     Physician Information:   none    Patient is Post-Op [] Yes   [] No     DOS:      Charge Package $210: 21, 3/8/22, 22     Subjective Doing well Doing well Doing well Doing well Doing well                          4/7 5/7 6/7 7/7 1/7 2/7 3/7   Objective Hamstring 90  Only 1 pillow today HEP [address] [place] [address]  [id number]   Starting with upper body work and then reformer. Goals 1.  Decrease back pain, TA strengthening                   Reformer Exercises    Lumbar Roll    3 folded towels under head with headrest up #7  15x 2R1G        15x        15x/10sec stretch        U squat 2R1B 20x #3  15x 2R1G        15x        15x/10sec stretch        U squat 2R1B 20x #7  2x10 2R1G        2x10        2x10        U squat 2R1B 15x   #3  2x10 2R1G        2x10        2x10        U squat 2R1B 15x #7  25x 2R1G, ER        2x10        2x10/10sec stretch        U squat 2R1B 20x #7  25x 2R1G, ER        2x10        2x10/10sec stretch        U squat 2R1B 20x #3  15x 2R1G        15x        15x/10sec stretch        U squat 2R1B 20x   Pelvic Stabilization   #3 2x10 #4 Bridges 2R1B on reformer #4 2x10 #3 2x10  No springs #3 2x10 #3 2x10 #4 2R1B on reformer    #12 Standing 1R1Y 2x10 8# 2x10 #8 15x #8 15x #11 2x10 #11 2x10 #7 2x10                       Trunk Stabilization #5 10x3 marches at a time    4x 8 sec with lat pull #6 1R 15x   #6 15x #5 10x3 marches at a time    Tabletop position practice 4x 8sec #5 lat pulls short yellow     4x 8sec #6 1R 15x          1R tabletop on reformer                        Hip Disassociation #8 2R 2x10 5# 2R 2x10 #5 2x10 2R #5 2x10 2R1B #8 2x10 2R1B #8 2x10 2R   #5 2x10 2R     Arcs, V's circles         #4 Trap table marches and biking long yellow spring around thigh 12x #4 Trap table marches and biking long yellow spring around thigh 12x #4 Trap table marches and biking long yellow spring around thigh 12x  #               Scapular Stabilization #11 rows 2R 2x10 #1 seated on box  Biceps, rows 1R1B  Ext 1R #1 seated on box  Biceps, rows 1R1B  Ext 1R #1 seated on box  Biceps, rows 1R1B  Ext 1R   #1 seated on box  Biceps, rows 1R1B  Ext 1R                                 Thoracic Mobility #6 3B 15x  Further extension #2 1R 10x on reformer #2 1R 10x on reformer #2 10x on reformer #6 3B 10x   #6 3B 10x #2 1R 10x on reformer                                 General ROM Hamstring 1 min #7 1 min #7 1 min #7 1 min #9 1 min with ER #9 1 min 1 min    #10 post capsule    #10 post capsule #10 post capsule  1 min    Psoas 1R 1 min   Did not feel stretch as well       mermaid             Other  #1 posterior pelvic tilt 10x  #2 SB knee to chest 15x   #1 posterior pelvic tilt 10x  #2 SB knee to chest 15x #1 posterior pelvic tilt 10x  #2 SB knee to chest 15x                                  Summary/Comments HEP: bridges, laying flat hamstring stretches  Continue to progress Access Code: [email or web address]  URL: [email or web address]. com/  Date: 07/27/2021  Prepared by: Fransisco Kim    Exercises  Supine Posterior Pelvic Tilt - 1 x daily - 7 x weekly - 2 sets - 10 reps  Supine Hip and Knee Flexion AROM with Swiss Ball - 1 x daily - 7 x weekly - 2 sets - 10 reps  Seated Thoracic Extension AROM - 1 x daily - 7 x weekly - 1 sets - 10 reps  Cat-Camel - 1 x daily - 7 x weekly - 1 sets - 10 reps                                         Electronically signed by:  Fransisco Kim

## 2022-06-28 ENCOUNTER — HOSPITAL ENCOUNTER (OUTPATIENT)
Dept: PHYSICAL THERAPY | Age: 62
Discharge: HOME | End: 2022-06-28

## 2022-06-28 NOTE — FLOWSHEET NOTE
[facility], [address]  Phone: [phone]  Fax [phone]      Date:  2022    Patient Name:  Tani Maurice    :  1960  MRN: [id number]  Restrictions/Precautions:    Medical/Treatment Diagnosis Information:  ·  none - general strengthening     Physician Information:   none    Patient is Post-Op [] Yes   [] No     DOS:      Charge Package $210: 21, 3/8/22, 22    Subjective Doing well Doing well Doing well Doing well Doing well Doing well                         4/7 5/7 6/7 7/7 1/7 2/7 3/7   Objective Hamstring 90  Only 1 pillow today HEP Glenora Paganini  [id number]   Starting with upper body work and then reformer. Goals 1.  Decrease back pain, TA strengthening                   Reformer Exercises    Lumbar Roll    3 folded towels under head with headrest up #7  15x 2R1G        15x        15x/10sec stretch        U squat 2R1B 20x #3  15x 2R1G        15x        15x/10sec stretch        U squat 2R1B 20x #7  2x10 2R1G        2x10        2x10        U squat 2R1B 15x   #3  2x10 2R1G        2x10        2x10        U squat 2R1B 15x #7  25x 2R1G, ER        2x10        2x10/10sec stretch        U squat 2R1B 20x #7  25x 2R1G, ER        2x10        2x10/10sec stretch        U squat 2R1B 20x #3  15x 2R1G        15x        15x/10sec stretch        U squat 2R1B 20x   Pelvic Stabilization   #3 2x10 #4 Bridges 2R1B on reformer #4 2x10 #3 2x10  No springs #3 2x10 #3 2x10 #4 2R1B on reformer    #12 Standing 1R1Y 2x10 8# 2x10 #8 15x #8 15x #11 2x10 #11 2x10 #7 2x10                       Trunk Stabilization #5 10x3 marches at a time    4x 8 sec with lat pull #6 1R 15x   #6 15x #5 10x3 marches at a time    Tabletop position practice 4x 8sec #5 lat pulls short yellow     4x 8sec #6 1R 15x          1R tabletop on reformer                        Hip Disassociation #8 2R 2x10 5# 2R 2x10 #5 2x10 2R #5 2x10 2R1B #8 2x10 2R1B #8 2x10 2R   #5 2x10 2R     Arcs, V's circles         #4 Trap table marches and biking long yellow spring around thigh 12x #4 Trap table marches and biking long yellow spring around thigh 12x #4 Trap table marches and biking long yellow spring around thigh 12x  #               Scapular Stabilization #11 rows 2R 2x10 #1 seated on box  Biceps, rows 1R1B  Ext 1R #1 seated on box  Biceps, rows 1R1B  Ext 1R #1 seated on box  Biceps, rows 1R1B  Ext 1R   #1 seated on box  Biceps, rows 1R1B  Ext 1R                                 Thoracic Mobility #6 3B 15x  Further extension #2 1R 10x on reformer #2 1R 10x on reformer #2 10x on reformer #6 3B 10x   #6 3B 10x #2 1R 10x on reformer                                 General ROM Hamstring 1 min #7 1 min #7 1 min #7 1 min #9 1 min with ER #9 1 min 1 min    #10 post capsule    #10 post capsule #10 post capsule  1 min    Psoas 1R 1 min   Did not feel stretch as well       mermaid             Other  #1 posterior pelvic tilt 10x  #2 SB knee to chest 15x   #1 posterior pelvic tilt 10x  #2 SB knee to chest 15x #1 posterior pelvic tilt 10x  #2 SB knee to chest 15x                                  Summary/Comments HEP: bridges, laying flat hamstring stretches  Continue to progress Access Code: [email or web address]  URL: [email or web address]. com/  Date: 07/27/2021  Prepared by: Fransisco Kim    Exercises  Supine Posterior Pelvic Tilt - 1 x daily - 7 x weekly - 2 sets - 10 reps  Supine Hip and Knee Flexion AROM with Swiss Ball - 1 x daily - 7 x weekly - 2 sets - 10 reps  Seated Thoracic Extension AROM - 1 x daily - 7 x weekly - 1 sets - 10 reps  Cat-Camel - 1 x daily - 7 x weekly - 1 sets - 10 reps                                         Electronically signed by:  Fransisco Kim

## 2022-07-12 ENCOUNTER — HOSPITAL ENCOUNTER (OUTPATIENT)
Dept: PHYSICAL THERAPY | Age: 62
Discharge: HOME | End: 2022-07-12

## 2022-07-12 NOTE — FLOWSHEET NOTE
[facility]  [address]  Phone: [phone]  Fax [phone]      Date:  2022    Patient Name:  Ysabel Malik    :  1960  MRN: [id number]  Restrictions/Precautions:    Medical/Treatment Diagnosis Information:  ·  none - general strengthening     Physician Information:   none    Patient is Post-Op [] Yes   [] No     DOS:      Charge Package $210: 21, 3/8/22, 22   Subjective Doing well Doing well Doing well Doing well Doing well Doing well Doing well                        4/7 5/7 6/7 7/7 1/7 2/7 3/7   Objective Hamstring 90  Only 1 pillow today HEP [address] [place] [address]   Starting with upper body work and then reformer. Goals 1.  Decrease back pain, TA strengthening                   Reformer Exercises    Lumbar Roll    3 folded towels under head with headrest up #7  15x 2R1G        15x        15x/10sec stretch        U squat 2R1B 20x #3  15x 2R1G        15x        15x/10sec stretch        U squat 2R1B 20x #7  2x10 2R1G        2x10        2x10        U squat 2R1B 15x   #3  2x10 2R1G        2x10        2x10        U squat 2R1B 15x #7  25x 2R1G, ER        2x10        2x10/10sec stretch        U squat 2R1B 20x #7  25x 2R1G, ER        2x10        2x10/10sec stretch        U squat 2R1B 20x #3  15x 2R1G        15x        15x/10sec stretch        U squat 2R1B 20x   Pelvic Stabilization   #3 2x10 #4 Bridges 2R1B on reformer #4 2x10 #3 2x10  No springs #3 2x10 #3 2x10 #4 2R1B on reformer    #12 Standing 1R1Y 2x10 8# 2x10 #8 15x #8 15x #11 2x10 #11 2x10 #7 2x10                       Trunk Stabilization #5 10x3 marches at a time    4x 8 sec with lat pull #6 1R 15x   #6 15x #5 10x3 marches at a time    Tabletop position practice 4x 8sec #5 lat pulls short yellow     4x 8sec #6 1R 15x          1R tabletop on reformer                        Hip Disassociation #8 2R 2x10 5# 2R 2x10 #5 2x10 2R #5 2x10 2R1B #8 2x10 2R1B #8 2x10 2R   #5 2x10 2R     Arcs, V's circles         #4 Trap table marches and biking long yellow spring around thigh 12x #4 Trap table marches and biking long yellow spring around thigh 12x #4 Trap table marches and biking long yellow spring around thigh 12x  #               Scapular Stabilization #11 rows 2R 2x10 #1 seated on box  Biceps, rows 1R1B  Ext 1R #1 seated on box  Biceps, rows 1R1B  Ext 1R #1 seated on box  Biceps, rows 1R1B  Ext 1R   #1 seated on box  Biceps, rows 1R1B  Ext 1R                                 Thoracic Mobility #6 3B 15x  Further extension #2 1R 10x on reformer #2 1R 10x on reformer #2 10x on reformer #6 3B 10x   #6 3B 10x #2 1R 10x on reformer                                 General ROM Hamstring 1 min #7 1 min #7 1 min #7 1 min #9 1 min with ER #9 1 min 1 min    #10 post capsule    #10 post capsule #10 post capsule  1 min    Psoas 1R 1 min   Did not feel stretch as well       mermaid             Other  #1 posterior pelvic tilt 10x  #2 SB knee to chest 15x   #1 posterior pelvic tilt 10x  #2 SB knee to chest 15x #1 posterior pelvic tilt 10x  #2 SB knee to chest 15x                                  Summary/Comments HEP: bridges, laying flat hamstring stretches  Continue to progress Access Code: [email or web address]  URL: [email or web address]. com/  Date: 07/27/2021  Prepared by: Keyanna Thompson    Exercises  Supine Posterior Pelvic Tilt - 1 x daily - 7 x weekly - 2 sets - 10 reps  Supine Hip and Knee Flexion AROM with Swiss Ball - 1 x daily - 7 x weekly - 2 sets - 10 reps  Seated Thoracic Extension AROM - 1 x daily - 7 x weekly - 1 sets - 10 reps  Cat-Camel - 1 x daily - 7 x weekly - 1 sets - 10 reps                                         Electronically signed by:  Keyanna Thompson

## 2022-07-22 ENCOUNTER — HOSPITAL ENCOUNTER (OUTPATIENT)
Dept: PHYSICAL THERAPY | Age: 62
Discharge: HOME | End: 2022-07-22

## 2022-07-22 NOTE — FLOWSHEET NOTE
[facility], [address]  Phone: [phone]  Fax [phone]      Date:  2022    Patient Name:  Gabby Murray    :  1960  MRN: [id number]  Restrictions/Precautions:    Medical/Treatment Diagnosis Information:   none - general strengthening     Physician Information:   none    Patient is Post-Op [] Yes   [] No     DOS:      Charge Package $210: 21, 3/8/22, 22         Subjective Doing well                              4/7 5/7 6/7 7/7 1/7 2/7 3/7   Objective Hamstring 90  Only 1 pillow today HEP Kitty Villanueva  [id number]   Starting with upper body work and then reformer. Goals 1.  Decrease back pain, TA strengthening                   Reformer Exercises    Lumbar Roll    3 folded towels under head with headrest up #7  15x 2R1G        15x        15x/10sec stretch        U squat 2R1B 20x #3  15x 2R1G        15x        15x/10sec stretch        U squat 2R1B 20x #7  2x10 2R1G        2x10        2x10        U squat 2R1B 15x   #3  2x10 2R1G        2x10        2x10        U squat 2R1B 15x #7  25x 2R1G, ER        2x10        2x10/10sec stretch        U squat 2R1B 20x #7  25x 2R1G, ER        2x10        2x10/10sec stretch        U squat 2R1B 20x #3  15x 2R1G        15x        15x/10sec stretch        U squat 2R1B 20x   Pelvic Stabilization   #3 2x10 #4 Bridges 2R1B on reformer #4 2x10 #3 2x10  No springs #3 2x10 #3 2x10 #4 2R1B on reformer    #12 Standing 1R1Y 2x10 8# 2x10 #8 15x #8 15x #11 2x10 #11 2x10 #7 2x10                       Trunk Stabilization #5 10x3 marches at a time    4x 8 sec with lat pull #6 1R 15x   #6 15x #5 10x3 marches at a time    Tabletop position practice 4x 8sec #5 lat pulls short yellow     4x 8sec #6 1R 15x          1R tabletop on reformer                        Hip Disassociation #8 2R 2x10 5# 2R 2x10 #5 2x10 2R #5 2x10 2R1B #8 2x10 2R1B #8 2x10 2R   #5 2x10 2R     Arcs, V's circles #4 Trap table marches and biking long yellow spring around thigh 12x #4 Trap table marches and biking long yellow spring around thigh 12x #4 Trap table marches and biking long yellow spring around thigh 12x  #             Scapular Stabilization #11 rows 2R 2x10 #1 seated on box  Biceps, rows 1R1B  Ext 1R #1 seated on box  Biceps, rows 1R1B  Ext 1R #1 seated on box  Biceps, rows 1R1B  Ext 1R   #1 seated on box  Biceps, rows 1R1B  Ext 1R                                 Thoracic Mobility #6 3B 15x  Further extension #2 1R 10x on reformer #2 1R 10x on reformer #2 10x on reformer #6 3B 10x   #6 3B 10x #2 1R 10x on reformer                                 General ROM Hamstring 1 min #7 1 min #7 1 min #7 1 min #9 1 min with ER #9 1 min 1 min    #10 post capsule    #10 post capsule #10 post capsule  1 min    Psoas 1R 1 min   Did not feel stretch as well       mermaid             Other  #1 posterior pelvic tilt 10x  #2 SB knee to chest 15x   #1 posterior pelvic tilt 10x  #2 SB knee to chest 15x #1 posterior pelvic tilt 10x  #2 SB knee to chest 15x                               Summary/Comments HEP: bridges, laying flat hamstring stretches  Continue to progress Access Code: [email or web address]  URL: [email or web address]. com/  Date: 07/27/2021  Prepared by: Miri Brock    Exercises  Supine Posterior Pelvic Tilt - 1 x daily - 7 x weekly - 2 sets - 10 reps  Supine Hip and Knee Flexion AROM with Swiss Ball - 1 x daily - 7 x weekly - 2 sets - 10 reps  Seated Thoracic Extension AROM - 1 x daily - 7 x weekly - 1 sets - 10 reps  Cat-Camel - 1 x daily - 7 x weekly - 1 sets - 10 reps                                         Electronically signed by:  Miri Brock

## 2022-07-26 ENCOUNTER — HOSPITAL ENCOUNTER (OUTPATIENT)
Dept: PHYSICAL THERAPY | Age: 62
Discharge: HOME | End: 2022-07-26

## 2022-07-26 NOTE — FLOWSHEET NOTE
[facility], [address]  Phone: [phone]  Fax [phone]      Date:  2022    Patient Name:  Emily Almazan    :  1960  MRN: [id number]  Restrictions/Precautions:    Medical/Treatment Diagnosis Information:   none - general strengthening     Physician Information:   none    Patient is Post-Op [] Yes   [] No     DOS:      Charge Package $210: 21, 3/8/22, 22        Subjective Doing well Doing well                             4/7 5/7 6/7 7/7 1/7 2/7 3/7   Objective Hamstring 90  Only 1 pillow today HEP Leanor Kishoruel  [id number]   Starting with upper body work and then reformer. Goals 1.  Decrease back pain, TA strengthening                   Reformer Exercises    Lumbar Roll    3 folded towels under head with headrest up #7  15x 2R1G        15x        15x/10sec stretch        U squat 2R1B 20x #3  15x 2R1G        15x        15x/10sec stretch        U squat 2R1B 20x #7  2x10 2R1G        2x10        2x10        U squat 2R1B 15x   #3  2x10 2R1G        2x10        2x10        U squat 2R1B 15x #7  25x 2R1G, ER        2x10        2x10/10sec stretch        U squat 2R1B 20x #7  25x 2R1G, ER        2x10        2x10/10sec stretch        U squat 2R1B 20x #3  15x 2R1G        15x        15x/10sec stretch        U squat 2R1B 20x   Pelvic Stabilization   #3 2x10 #4 Bridges 2R1B on reformer #4 2x10 #3 2x10  No springs #3 2x10 #3 2x10 #4 2R1B on reformer    #12 Standing 1R1Y 2x10 8# 2x10 #8 15x #8 15x #11 2x10 #11 2x10 #7 2x10                       Trunk Stabilization #5 10x3 marches at a time    4x 8 sec with lat pull #6 1R 15x   #6 15x #5 10x3 marches at a time    Tabletop position practice 4x 8sec #5 lat pulls short yellow     4x 8sec #6 1R 15x          1R tabletop on reformer                        Hip Disassociation #8 2R 2x10 5# 2R 2x10 #5 2x10 2R #5 2x10 2R1B #8 2x10 2R1B #8 2x10 2R   #5 2x10 2R Arcs, V's circles         #4 Trap table marches and biking long yellow spring around thigh 12x #4 Trap table marches and biking long yellow spring around thigh 12x #4 Trap table marches and biking long yellow spring around thigh 12x  #             Scapular Stabilization #11 rows 2R 2x10 #1 seated on box  Biceps, rows 1R1B  Ext 1R #1 seated on box  Biceps, rows 1R1B  Ext 1R #1 seated on box  Biceps, rows 1R1B  Ext 1R   #1 seated on box  Biceps, rows 1R1B  Ext 1R                                 Thoracic Mobility #6 3B 15x  Further extension #2 1R 10x on reformer #2 1R 10x on reformer #2 10x on reformer #6 3B 10x   #6 3B 10x #2 1R 10x on reformer                                 General ROM Hamstring 1 min #7 1 min #7 1 min #7 1 min #9 1 min with ER #9 1 min 1 min    #10 post capsule    #10 post capsule #10 post capsule  1 min    Psoas 1R 1 min   Did not feel stretch as well       mermaid             Other  #1 posterior pelvic tilt 10x  #2 SB knee to chest 15x   #1 posterior pelvic tilt 10x  #2 SB knee to chest 15x #1 posterior pelvic tilt 10x  #2 SB knee to chest 15x                               Summary/Comments HEP: bridges, laying flat hamstring stretches  Continue to progress Access Code: [email or web address]  URL: [email or web address]. com/  Date: 07/27/2021  Prepared by: Nichole Villeda    Exercises  Supine Posterior Pelvic Tilt - 1 x daily - 7 x weekly - 2 sets - 10 reps  Supine Hip and Knee Flexion AROM with Swiss Ball - 1 x daily - 7 x weekly - 2 sets - 10 reps  Seated Thoracic Extension AROM - 1 x daily - 7 x weekly - 1 sets - 10 reps  Cat-Camel - 1 x daily - 7 x weekly - 1 sets - 10 reps                                         Electronically signed by:  Nichole Villeda

## 2022-08-09 ENCOUNTER — HOSPITAL ENCOUNTER (OUTPATIENT)
Dept: PHYSICAL THERAPY | Age: 62
Discharge: HOME | End: 2022-08-09

## 2022-08-09 NOTE — FLOWSHEET NOTE
[facility], [address]  Phone: [phone]  Fax [phone]      Date:  2022    Patient Name:  Chasidy Le    :  1960  MRN: [id number]  Restrictions/Precautions:    Medical/Treatment Diagnosis Information:   none - general strengthening     Physician Information:   none    Patient is Post-Op [] Yes   [] No     DOS:      Charge Package $210: 21, 3/8/22, 22       Subjective Doing well Doing well Doing well                            4/7 5/7 6/7 7/7 1/7 2/7 3/7   Objective Hamstring 90  Only 1 pillow today HEP Delwyn Dus  [id number]   Starting with upper body work and then reformer. Goals 1.  Decrease back pain, TA strengthening                   Reformer Exercises    Lumbar Roll    3 folded towels under head with headrest up #7  15x 2R1G        15x        15x/10sec stretch        U squat 2R1B 20x #3  15x 2R1G        15x        15x/10sec stretch        U squat 2R1B 20x #7  2x10 2R1G        2x10        2x10        U squat 2R1B 15x   #3  2x10 2R1G        2x10        2x10        U squat 2R1B 15x #7  25x 2R1G, ER        2x10        2x10/10sec stretch        U squat 2R1B 20x #7  25x 2R1G, ER        2x10        2x10/10sec stretch        U squat 2R1B 20x #3  15x 2R1G        15x        15x/10sec stretch        U squat 2R1B 20x   Pelvic Stabilization   #3 2x10 #4 Bridges 2R1B on reformer #4 2x10 #3 2x10  No springs #3 2x10 #3 2x10 #4 2R1B on reformer    #12 Standing 1R1Y 2x10 8# 2x10 #8 15x #8 15x #11 2x10 #11 2x10 #7 2x10                       Trunk Stabilization #5 10x3 marches at a time    4x 8 sec with lat pull #6 1R 15x   #6 15x #5 10x3 marches at a time    Tabletop position practice 4x 8sec #5 lat pulls short yellow     4x 8sec #6 1R 15x          1R tabletop on reformer                        Hip Disassociation #8 2R 2x10 5# 2R 2x10 #5 2x10 2R #5 2x10 2R1B #8 2x10 2R1B #8 2x10 2R   #5 2x10 2R     Arcs, V's circles         #4 Trap table marches and biking long yellow spring around thigh 12x #4 Trap table marches and biking long yellow spring around thigh 12x #4 Trap table marches and biking long yellow spring around thigh 12x  #             Scapular Stabilization #11 rows 2R 2x10 #1 seated on box  Biceps, rows 1R1B  Ext 1R #1 seated on box  Biceps, rows 1R1B  Ext 1R #1 seated on box  Biceps, rows 1R1B  Ext 1R   #1 seated on box  Biceps, rows 1R1B  Ext 1R                                 Thoracic Mobility #6 3B 15x  Further extension #2 1R 10x on reformer #2 1R 10x on reformer #2 10x on reformer #6 3B 10x   #6 3B 10x #2 1R 10x on reformer                                 General ROM Hamstring 1 min #7 1 min #7 1 min #7 1 min #9 1 min with ER #9 1 min 1 min    #10 post capsule    #10 post capsule #10 post capsule  1 min    Psoas 1R 1 min   Did not feel stretch as well       mermaid             Other  #1 posterior pelvic tilt 10x  #2 SB knee to chest 15x   #1 posterior pelvic tilt 10x  #2 SB knee to chest 15x #1 posterior pelvic tilt 10x  #2 SB knee to chest 15x                               Summary/Comments HEP: bridges, laying flat hamstring stretches  Continue to progress Access Code: [email or web address]  URL: [email or web address]  Date: 07/27/2021  Prepared by: Nuris Ceballos    Exercises  Supine Posterior Pelvic Tilt - 1 x daily - 7 x weekly - 2 sets - 10 reps  Supine Hip and Knee Flexion AROM with Swiss Ball - 1 x daily - 7 x weekly - 2 sets - 10 reps  Seated Thoracic Extension AROM - 1 x daily - 7 x weekly - 1 sets - 10 reps  Cat-Camel - 1 x daily - 7 x weekly - 1 sets - 10 reps                                         Electronically signed by:  Nuris Ceballos

## 2022-08-23 ENCOUNTER — HOSPITAL ENCOUNTER (OUTPATIENT)
Dept: PHYSICAL THERAPY | Age: 62
Discharge: HOME | End: 2022-08-23

## 2022-08-23 NOTE — FLOWSHEET NOTE
[facility], [address]  Phone: [phone]  Fax [phone]      Date:  2022    Patient Name:  Yobany Diaz    :  1960  MRN: [id number]  Restrictions/Precautions:    Medical/Treatment Diagnosis Information:   none - general strengthening     Physician Information:   none    Patient is Post-Op [] Yes   [] No     DOS:      Charge Package $210: 21, 3/8/22, 22      Subjective Doing well Doing well Doing well Doing well                           4/7 5/7 6/7 7/7 1/7 2/7 3/7   Objective Hamstring 90  Only 1 pillow today HEP Bandar Kessler  [id number]   Starting with upper body work and then reformer. Goals 1.  Decrease back pain, TA strengthening                   Reformer Exercises    Lumbar Roll    3 folded towels under head with headrest up #7  15x 2R1G        15x        15x/10sec stretch        U squat 2R1B 20x #3  15x 2R1G        15x        15x/10sec stretch        U squat 2R1B 20x #7  2x10 2R1G        2x10        2x10        U squat 2R1B 15x   #3  2x10 2R1G        2x10        2x10        U squat 2R1B 15x #7  25x 2R1G, ER        2x10        2x10/10sec stretch        U squat 2R1B 20x #7  25x 2R1G, ER        2x10        2x10/10sec stretch        U squat 2R1B 20x #3  15x 2R1G        15x        15x/10sec stretch        U squat 2R1B 20x   Pelvic Stabilization   #3 2x10 #4 Bridges 2R1B on reformer #4 2x10 #3 2x10  No springs #3 2x10 #3 2x10 #4 2R1B on reformer    #12 Standing 1R1Y 2x10 8# 2x10 #8 15x #8 15x #11 2x10 #11 2x10 #7 2x10                       Trunk Stabilization #5 10x3 marches at a time    4x 8 sec with lat pull #6 1R 15x   #6 15x #5 10x3 marches at a time    Tabletop position practice 4x 8sec #5 lat pulls short yellow     4x 8sec #6 1R 15x          1R tabletop on reformer                        Hip Disassociation #8 2R 2x10 5# 2R 2x10 #5 2x10 2R #5 2x10 2R1B #8 2x10 2R1B #8 2x10 2R   #5 2x10 2R     Arcs, V's circles         #4 Trap table marches and biking long yellow spring around thigh 12x #4 Trap table marches and biking long yellow spring around thigh 12x #4 Trap table marches and biking long yellow spring around thigh 12x  #             Scapular Stabilization #11 rows 2R 2x10 #1 seated on box  Biceps, rows 1R1B  Ext 1R #1 seated on box  Biceps, rows 1R1B  Ext 1R #1 seated on box  Biceps, rows 1R1B  Ext 1R   #1 seated on box  Biceps, rows 1R1B  Ext 1R                                 Thoracic Mobility #6 3B 15x  Further extension #2 1R 10x on reformer #2 1R 10x on reformer #2 10x on reformer #6 3B 10x   #6 3B 10x #2 1R 10x on reformer                                 General ROM Hamstring 1 min #7 1 min #7 1 min #7 1 min #9 1 min with ER #9 1 min 1 min    #10 post capsule    #10 post capsule #10 post capsule  1 min    Psoas 1R 1 min   Did not feel stretch as well       mermaid             Other  #1 posterior pelvic tilt 10x  #2 SB knee to chest 15x   #1 posterior pelvic tilt 10x  #2 SB knee to chest 15x #1 posterior pelvic tilt 10x  #2 SB knee to chest 15x                               Summary/Comments HEP: bridges, laying flat hamstring stretches  Continue to progress Access Code: [email or web address]  URL: [email or web address]  Date: 07/27/2021  Prepared by: Viviana Canada    Exercises  Supine Posterior Pelvic Tilt - 1 x daily - 7 x weekly - 2 sets - 10 reps  Supine Hip and Knee Flexion AROM with Swiss Ball - 1 x daily - 7 x weekly - 2 sets - 10 reps  Seated Thoracic Extension AROM - 1 x daily - 7 x weekly - 1 sets - 10 reps  Cat-Camel - 1 x daily - 7 x weekly - 1 sets - 10 reps                                         Electronically signed by:  Viviana Canada

## 2022-09-13 ENCOUNTER — HOSPITAL ENCOUNTER (OUTPATIENT)
Dept: PHYSICAL THERAPY | Age: 62
Discharge: HOME | End: 2022-09-13

## 2022-09-13 NOTE — FLOWSHEET NOTE
[facility], [address]  Phone: [phone]  Fax [phone]      Date:  2022    Patient Name:  Shahram Goodwin    :  1960  MRN: [id number]  Restrictions/Precautions:    Medical/Treatment Diagnosis Information:   none - general strengthening     Physician Information:   none    Patient is Post-Op [] Yes   [] No     DOS:      Charge Package $210: 21, 3/8/22, 22     Subjective Doing well Doing well Doing well Doing well Doing well CHARGE HERE                         4/7 5/7 6/7 7/7 1/7 2/7 3/7   Objective Hamstring 90  Only 1 pillow today HEP Anthony Kent  [id number]   Starting with upper body work and then reformer. Goals 1.  Decrease back pain, TA strengthening                   Reformer Exercises    Lumbar Roll    3 folded towels under head with headrest up #7  15x 2R1G        15x        15x/10sec stretch        U squat 2R1B 20x #3  15x 2R1G        15x        15x/10sec stretch        U squat 2R1B 20x #7  2x10 2R1G        2x10        2x10        U squat 2R1B 15x   #3  2x10 2R1G        2x10        2x10        U squat 2R1B 15x #7  25x 2R1G, ER        2x10        2x10/10sec stretch        U squat 2R1B 20x #7  25x 2R1G, ER        2x10        2x10/10sec stretch        U squat 2R1B 20x #3  15x 2R1G        15x        15x/10sec stretch        U squat 2R1B 20x   Pelvic Stabilization   #3 2x10 #4 Bridges 2R1B on reformer #4 2x10 #3 2x10  No springs #3 2x10 #3 2x10 #4 2R1B on reformer    #12 Standing 1R1Y 2x10 8# 2x10 #8 15x #8 15x #11 2x10 #11 2x10 #7 2x10                       Trunk Stabilization #5 10x3 marches at a time    4x 8 sec with lat pull #6 1R 15x   #6 15x #5 10x3 marches at a time    Tabletop position practice 4x 8sec #5 lat pulls short yellow     4x 8sec #6 1R 15x          1R tabletop on reformer                        Hip Disassociation #8 2R 2x10 5# 2R 2x10 #5 2x10 2R #5 2x10 2R1B #8 2x10 2R1B #8 2x10 2R   #5 2x10 2R     Arcs, V's circles         #4 Trap table marches and biking long yellow spring around thigh 12x #4 Trap table marches and biking long yellow spring around thigh 12x #4 Trap table marches and biking long yellow spring around thigh 12x  #             Scapular Stabilization #11 rows 2R 2x10 #1 seated on box  Biceps, rows 1R1B  Ext 1R #1 seated on box  Biceps, rows 1R1B  Ext 1R #1 seated on box  Biceps, rows 1R1B  Ext 1R   #1 seated on box  Biceps, rows 1R1B  Ext 1R                                 Thoracic Mobility #6 3B 15x  Further extension #2 1R 10x on reformer #2 1R 10x on reformer #2 10x on reformer #6 3B 10x   #6 3B 10x #2 1R 10x on reformer                                 General ROM Hamstring 1 min #7 1 min #7 1 min #7 1 min #9 1 min with ER #9 1 min 1 min    #10 post capsule    #10 post capsule #10 post capsule  1 min    Psoas 1R 1 min   Did not feel stretch as well       mermaid             Other  #1 posterior pelvic tilt 10x  #2 SB knee to chest 15x   #1 posterior pelvic tilt 10x  #2 SB knee to chest 15x #1 posterior pelvic tilt 10x  #2 SB knee to chest 15x                               Summary/Comments HEP: bridges, laying flat hamstring stretches  Continue to progress Access Code: [email or web address]  URL: [email or web address]/  Date: 07/27/2021  Prepared by: Keyanna Thompson    Exercises  Supine Posterior Pelvic Tilt - 1 x daily - 7 x weekly - 2 sets - 10 reps  Supine Hip and Knee Flexion AROM with Swiss Ball - 1 x daily - 7 x weekly - 2 sets - 10 reps  Seated Thoracic Extension AROM - 1 x daily - 7 x weekly - 1 sets - 10 reps  Cat-Camel - 1 x daily - 7 x weekly - 1 sets - 10 reps                                         Electronically signed by:  Keyanna Thompson

## 2022-09-20 ENCOUNTER — HOSPITAL ENCOUNTER (OUTPATIENT)
Dept: PHYSICAL THERAPY | Age: 62
Discharge: HOME | End: 2022-09-20

## 2022-09-20 PROCEDURE — 9990000029 HC GAP PACKAGE: Performed by: SPECIALIST/TECHNOLOGIST

## 2022-09-20 NOTE — FLOWSHEET NOTE
[facility], [address]  Phone: [phone]  Fax [phone]      Date:  2022    Patient Name:  Preeti Nolasco    :  1960  MRN: [id number]  Restrictions/Precautions:    Medical/Treatment Diagnosis Information:   none - general strengthening     Physician Information:   none    Patient is Post-Op [] Yes   [] No     DOS:      Charge Package $210: 21, 3/8/22, 22, 22    Subjective Doing well Doing well Doing well Doing well Doing well Doing well                         4/7 5/7 6/7 7/7 1/7 2/7 3/7   Objective Hamstring 90  Only 1 pillow today HEP [address] [place] [address]  [id number]   Starting with upper body work and then reformer. Goals 1.  Decrease back pain, TA strengthening                   Reformer Exercises    Lumbar Roll    3 folded towels under head with headrest up #7  15x 2R1G        15x        15x/10sec stretch        U squat 2R1B 20x #3  15x 2R1G        15x        15x/10sec stretch        U squat 2R1B 20x #7  2x10 2R1G        2x10        2x10        U squat 2R1B 15x   #3  2x10 2R1G        2x10        2x10        U squat 2R1B 15x #7  25x 2R1G, ER        2x10        2x10/10sec stretch        U squat 2R1B 20x #7  25x 2R1G, ER        2x10        2x10/10sec stretch        U squat 2R1B 20x #3  15x 2R1G        15x        15x/10sec stretch        U squat 2R1B 20x   Pelvic Stabilization   #3 2x10 #4 Bridges 2R1B on reformer #4 2x10 #3 2x10  No springs #3 2x10 #3 2x10 #4 2R1B on reformer    #12 Standing 1R1Y 2x10 8# 2x10 #8 15x #8 15x #11 2x10 #11 2x10 #7 2x10                       Trunk Stabilization #5 10x3 marches at a time    4x 8 sec with lat pull #6 1R 15x   #6 15x #5 10x3 marches at a time    Tabletop position practice 4x 8sec #5 lat pulls short yellow     4x 8sec #6 1R 15x          1R tabletop on reformer                        Hip Disassociation #8 2R 2x10 5# 2R 2x10 #5 2x10 2R #5 2x10 2R1B #8 2x10 2R1B #8 2x10 2R   #5 2x10 2R     Arcs, V's circles         #4 Trap table marches and biking long yellow spring around thigh 12x #4 Trap table marches and biking long yellow spring around thigh 12x #4 Trap table marches and biking long yellow spring around thigh 12x  #             Scapular Stabilization #11 rows 2R 2x10 #1 seated on box  Biceps, rows 1R1B  Ext 1R #1 seated on box  Biceps, rows 1R1B  Ext 1R #1 seated on box  Biceps, rows 1R1B  Ext 1R   #1 seated on box  Biceps, rows 1R1B  Ext 1R                                 Thoracic Mobility #6 3B 15x  Further extension #2 1R 10x on reformer #2 1R 10x on reformer #2 10x on reformer #6 3B 10x   #6 3B 10x #2 1R 10x on reformer                                 General ROM Hamstring 1 min #7 1 min #7 1 min #7 1 min #9 1 min with ER #9 1 min 1 min    #10 post capsule    #10 post capsule #10 post capsule  1 min    Psoas 1R 1 min   Did not feel stretch as well       mermaid             Other  #1 posterior pelvic tilt 10x  #2 SB knee to chest 15x   #1 posterior pelvic tilt 10x  #2 SB knee to chest 15x #1 posterior pelvic tilt 10x  #2 SB knee to chest 15x                               Summary/Comments HEP: bridges, laying flat hamstring stretches  Continue to progress Access Code: [email or web address]  URL: [email or web address]/  Date: 07/27/2021  Prepared by: Milvia Núñez    Exercises  Supine Posterior Pelvic Tilt - 1 x daily - 7 x weekly - 2 sets - 10 reps  Supine Hip and Knee Flexion AROM with Swiss Ball - 1 x daily - 7 x weekly - 2 sets - 10 reps  Seated Thoracic Extension AROM - 1 x daily - 7 x weekly - 1 sets - 10 reps  Cat-Camel - 1 x daily - 7 x weekly - 1 sets - 10 reps                                         Electronically signed by:  Milvia Núñez

## 2022-09-27 ENCOUNTER — HOSPITAL ENCOUNTER (OUTPATIENT)
Dept: PHYSICAL THERAPY | Age: 62
Discharge: HOME | End: 2022-09-27

## 2022-09-27 NOTE — FLOWSHEET NOTE
[facility], [address]  Phone: [phone]  Fax [phone]      Date:  2022    Patient Name:  Wilfred Wasserman    :  1960  MRN: [id number]  Restrictions/Precautions:    Medical/Treatment Diagnosis Information:   none - general strengthening     Physician Information:   none    Patient is Post-Op [] Yes   [] No     DOS:      Charge Package $210: 21, 3/8/22, 22, 22   Subjective Doing well Doing well Doing well Doing well Doing well Doing well Doing well                        4/7 5/7 6/7 7/7 1/7 2/7 3/7   Objective Hamstring 90  Only 1 pillow today HEP Fer Oats  [id number]   Starting with upper body work and then reformer. Goals 1.  Decrease back pain, TA strengthening                   Reformer Exercises    Lumbar Roll    3 folded towels under head with headrest up #7  15x 2R1G        15x        15x/10sec stretch        U squat 2R1B 20x #3  15x 2R1G        15x        15x/10sec stretch        U squat 2R1B 20x #7  2x10 2R1G        2x10        2x10        U squat 2R1B 15x   #3  2x10 2R1G        2x10        2x10        U squat 2R1B 15x #7  25x 2R1G, ER        2x10        2x10/10sec stretch        U squat 2R1B 20x #7  25x 2R1G, ER        2x10        2x10/10sec stretch        U squat 2R1B 20x #3  15x 2R1G        15x        15x/10sec stretch        U squat 2R1B 20x   Pelvic Stabilization   #3 2x10 #4 Bridges 2R1B on reformer #4 2x10 #3 2x10  No springs #3 2x10 #3 2x10 #4 2R1B on reformer    #12 Standing 1R1Y 2x10 8# 2x10 #8 15x #8 15x #11 2x10 #11 2x10 #7 2x10                       Trunk Stabilization #5 10x3 marches at a time    4x 8 sec with lat pull #6 1R 15x   #6 15x #5 10x3 marches at a time    Tabletop position practice 4x 8sec #5 lat pulls short yellow     4x 8sec #6 1R 15x          1R tabletop on reformer                        Hip Disassociation #8 2R 2x10 5# 2R 2x10 #5 2x10 2R #5 2x10 2R1B #8 2x10 2R1B #8 2x10 2R   #5 2x10 2R     Arcs, V's circles         #4 Trap table marches and biking long yellow spring around thigh 12x #4 Trap table marches and biking long yellow spring around thigh 12x #4 Trap table marches and biking long yellow spring around thigh 12x  #             Scapular Stabilization #11 rows 2R 2x10 #1 seated on box  Biceps, rows 1R1B  Ext 1R #1 seated on box  Biceps, rows 1R1B  Ext 1R #1 seated on box  Biceps, rows 1R1B  Ext 1R   #1 seated on box  Biceps, rows 1R1B  Ext 1R                                 Thoracic Mobility #6 3B 15x  Further extension #2 1R 10x on reformer #2 1R 10x on reformer #2 10x on reformer #6 3B 10x   #6 3B 10x #2 1R 10x on reformer                                 General ROM Hamstring 1 min #7 1 min #7 1 min #7 1 min #9 1 min with ER #9 1 min 1 min    #10 post capsule    #10 post capsule #10 post capsule  1 min    Psoas 1R 1 min   Did not feel stretch as well       mermaid             Other  #1 posterior pelvic tilt 10x  #2 SB knee to chest 15x   #1 posterior pelvic tilt 10x  #2 SB knee to chest 15x #1 posterior pelvic tilt 10x  #2 SB knee to chest 15x                               Summary/Comments HEP: bridges, laying flat hamstring stretches  Continue to progress Access Code: [email or web address]  URL: [email or web address]  Date: 07/27/2021  Prepared by: Rosie Russell    Exercises  Supine Posterior Pelvic Tilt - 1 x daily - 7 x weekly - 2 sets - 10 reps  Supine Hip and Knee Flexion AROM with Swiss Ball - 1 x daily - 7 x weekly - 2 sets - 10 reps  Seated Thoracic Extension AROM - 1 x daily - 7 x weekly - 1 sets - 10 reps  Cat-Camel - 1 x daily - 7 x weekly - 1 sets - 10 reps                                         Electronically signed by:  Rosie Russell

## 2022-10-04 ENCOUNTER — HOSPITAL ENCOUNTER (OUTPATIENT)
Dept: PHYSICAL THERAPY | Age: 62
Discharge: HOME | End: 2022-10-04

## 2022-10-04 NOTE — FLOWSHEET NOTE
[facility], [address]  Phone: [phone]  Fax [phone]      Date:  10/4/2022    Patient Name:  Nkechi Bautista    :  1960  MRN: [id number]  Restrictions/Precautions:    Medical/Treatment Diagnosis Information:   none - general strengthening     Physician Information:   none    Patient is Post-Op [] Yes   [] No     DOS:      Charge Package $210: 21, 3/8/22, 22, 9/20/22     10/4/22         Subjective Doing well                              4/7 5/7 6/7 7/7 1/7 2/7 3/7   Objective Hamstring 90  Only 1 pillow today HEP [place]  [id number]   Starting with upper body work and then reformer. Goals 1.  Decrease back pain, TA strengthening                   Reformer Exercises    Lumbar Roll    3 folded towels under head with headrest up #7  15x 2R1G        15x        15x/10sec stretch        U squat 2R1B 20x #3  15x 2R1G        15x        15x/10sec stretch        U squat 2R1B 20x #7  2x10 2R1G        2x10        2x10        U squat 2R1B 15x   #3  2x10 2R1G        2x10        2x10        U squat 2R1B 15x #7  25x 2R1G, ER        2x10        2x10/10sec stretch        U squat 2R1B 20x #7  25x 2R1G, ER        2x10        2x10/10sec stretch        U squat 2R1B 20x #3  15x 2R1G        15x        15x/10sec stretch        U squat 2R1B 20x   Pelvic Stabilization   #3 2x10 #4 Bridges 2R1B on reformer #4 2x10 #3 2x10  No springs #3 2x10 #3 2x10 #4 2R1B on reformer    #12 Standing 1R1Y 2x10 8# 2x10 #8 15x #8 15x #11 2x10 #11 2x10 #7 2x10                       Trunk Stabilization #5 10x3 marches at a time    4x 8 sec with lat pull #6 1R 15x   #6 15x #5 10x3 marches at a time    Tabletop position practice 4x 8sec #5 lat pulls short yellow     4x 8sec #6 1R 15x          1R tabletop on reformer                        Hip Disassociation #8 2R 2x10 5# 2R 2x10 #5 2x10 2R #5 2x10 2R1B #8 2x10 2R1B #8 2x10 2R   #5 2x10 2R     GABY Stone's circles         #4 Trap table marches and biking long yellow spring around thigh 12x #4 Trap table marches and biking long yellow spring around thigh 12x #4 Trap table marches and biking long yellow spring around thigh 12x  #             Scapular Stabilization #11 rows 2R 2x10 #1 seated on box  Biceps, rows 1R1B  Ext 1R #1 seated on box  Biceps, rows 1R1B  Ext 1R #1 seated on box  Biceps, rows 1R1B  Ext 1R   #1 seated on box  Biceps, rows 1R1B  Ext 1R                                 Thoracic Mobility #6 3B 15x  Further extension #2 1R 10x on reformer #2 1R 10x on reformer #2 10x on reformer #6 3B 10x   #6 3B 10x #2 1R 10x on reformer                                 General ROM Hamstring 1 min #7 1 min #7 1 min #7 1 min #9 1 min with ER #9 1 min 1 min    #10 post capsule    #10 post capsule #10 post capsule  1 min    Psoas 1R 1 min   Did not feel stretch as well       mermaid             Other  #1 posterior pelvic tilt 10x  #2 SB knee to chest 15x   #1 posterior pelvic tilt 10x  #2 SB knee to chest 15x #1 posterior pelvic tilt 10x  #2 SB knee to chest 15x                               Summary/Comments HEP: bridges, laying flat hamstring stretches  Continue to progress Access Code: [email or web address]  URL: [email or web address]  Date: 07/27/2021  Prepared by: Mundo Flores    Exercises  Supine Posterior Pelvic Tilt - 1 x daily - 7 x weekly - 2 sets - 10 reps  Supine Hip and Knee Flexion AROM with Swiss Ball - 1 x daily - 7 x weekly - 2 sets - 10 reps  Seated Thoracic Extension AROM - 1 x daily - 7 x weekly - 1 sets - 10 reps  Cat-Camel - 1 x daily - 7 x weekly - 1 sets - 10 reps                                         Electronically signed by:  Mundo Flores

## 2022-10-11 ENCOUNTER — HOSPITAL ENCOUNTER (OUTPATIENT)
Dept: PHYSICAL THERAPY | Age: 62
Discharge: HOME | End: 2022-10-11

## 2022-10-18 ENCOUNTER — HOSPITAL ENCOUNTER (OUTPATIENT)
Dept: PHYSICAL THERAPY | Age: 62
Discharge: HOME | End: 2022-10-18

## 2022-10-25 ENCOUNTER — HOSPITAL ENCOUNTER (OUTPATIENT)
Dept: PHYSICAL THERAPY | Age: 62
Discharge: HOME | End: 2022-10-25

## 2022-10-25 NOTE — FLOWSHEET NOTE
[facility], [address]  Phone: [phone]  Fax [phone]      Date:  10/25/2022    Patient Name:  Chris Yancey    :  1960  MRN: [id number]  Restrictions/Precautions:    Medical/Treatment Diagnosis Information:   none - general strengthening     Physician Information:   none    Patient is Post-Op [] Yes   [] No     DOS:      Charge Package $210: 21, 3/8/22, 22, 9/20/22     10/4/22 10/25/22        Subjective Doing well Doing well                             4/7 5/7 6/7 7/7 1/7 2/7 3/7   Objective Hamstring 90  Only 1 pillow today HEP [place] Come  [id number]   Starting with upper body work and then reformer. Goals 1.  Decrease back pain, TA strengthening                   Reformer Exercises    Lumbar Roll    3 folded towels under head with headrest up #7  15x 2R1G        15x        15x/10sec stretch        U squat 2R1B 20x #3  15x 2R1G        15x        15x/10sec stretch        U squat 2R1B 20x #7  2x10 2R1G        2x10        2x10        U squat 2R1B 15x   #3  2x10 2R1G        2x10        2x10        U squat 2R1B 15x #7  25x 2R1G, ER        2x10        2x10/10sec stretch        U squat 2R1B 20x #7  25x 2R1G, ER        2x10        2x10/10sec stretch        U squat 2R1B 20x #3  15x 2R1G        15x        15x/10sec stretch        U squat 2R1B 20x   Pelvic Stabilization   #3 2x10 #4 Bridges 2R1B on reformer #4 2x10 #3 2x10  No springs #3 2x10 #3 2x10 #4 2R1B on reformer    #12 Standing 1R1Y 2x10 8# 2x10 #8 15x #8 15x #11 2x10 #11 2x10 #7 2x10                       Trunk Stabilization #5 10x3 marches at a time    4x 8 sec with lat pull #6 1R 15x   #6 15x #5 10x3 marches at a time    Tabletop position practice 4x 8sec #5 lat pulls short yellow     4x 8sec #6 1R 15x          1R tabletop on reformer                        Hip Disassociation #8 2R 2x10 5# 2R 2x10 #5 2x10 2R #5 2x10 2R1B #8 2x10 2R1B #8 2x10 2R   #5 2x10 2R     Arcs, V's circles         #4 Trap table marches and biking long yellow spring around thigh 12x #4 Trap table marches and biking long yellow spring around thigh 12x #4 Trap table marches and biking long yellow spring around thigh 12x  #             Scapular Stabilization #11 rows 2R 2x10 #1 seated on box  Biceps, rows 1R1B  Ext 1R #1 seated on box  Biceps, rows 1R1B  Ext 1R #1 seated on box  Biceps, rows 1R1B  Ext 1R   #1 seated on box  Biceps, rows 1R1B  Ext 1R                                 Thoracic Mobility #6 3B 15x  Further extension #2 1R 10x on reformer #2 1R 10x on reformer #2 10x on reformer #6 3B 10x   #6 3B 10x #2 1R 10x on reformer                                 General ROM Hamstring 1 min #7 1 min #7 1 min #7 1 min #9 1 min with ER #9 1 min 1 min    #10 post capsule    #10 post capsule #10 post capsule  1 min    Psoas 1R 1 min   Did not feel stretch as well       mermaid             Other  #1 posterior pelvic tilt 10x  #2 SB knee to chest 15x   #1 posterior pelvic tilt 10x  #2 SB knee to chest 15x #1 posterior pelvic tilt 10x  #2 SB knee to chest 15x                               Summary/Comments HEP: bridges, laying flat hamstring stretches  Continue to progress Access Code: [email or web address]  URL: [email or web address]  Date: 07/27/2021  Prepared by: Esdras Mantilla    Exercises  Supine Posterior Pelvic Tilt - 1 x daily - 7 x weekly - 2 sets - 10 reps  Supine Hip and Knee Flexion AROM with Swiss Ball - 1 x daily - 7 x weekly - 2 sets - 10 reps  Seated Thoracic Extension AROM - 1 x daily - 7 x weekly - 1 sets - 10 reps  Cat-Camel - 1 x daily - 7 x weekly - 1 sets - 10 reps                                         Electronically signed by:  Esdras Mantilla

## 2022-11-01 ENCOUNTER — HOSPITAL ENCOUNTER (OUTPATIENT)
Dept: PHYSICAL THERAPY | Age: 62
Discharge: HOME | End: 2022-11-01

## 2022-11-01 NOTE — FLOWSHEET NOTE
[facility], [address]  Phone: [phone]  Fax [phone]      Date:  2022    Patient Name:  Chris Yancey    :  1960  MRN: [id number]  Restrictions/Precautions:    Medical/Treatment Diagnosis Information:   none - general strengthening     Physician Information:   none    Patient is Post-Op [] Yes   [] No     DOS:      Charge Package $210: 21, 3/8/22, 22, 9/20/22     10/4/22 10/25/22 11/1/22       Subjective Doing well Doing well Doing well                            4/7 5/7 6/7 7/7 1/7 2/7 3/7   Objective Hamstring 90   HEP [place] Come  [id number]   Starting with upper body work and then reformer. Goals 1.  Decrease back pain, TA strengthening                   Reformer Exercises    Lumbar Roll    3 folded towels under head with headrest up #7  15x 2R1G        15x        15x/10sec stretch        U squat 2R1B 20x #3  15x 2R1G        15x        15x/10sec stretch        U squat 2R1B 20x #7  2x10 2R1G        2x10        2x10        U squat 2R1B 15x   #3  2x10 2R1G        2x10        2x10        U squat 2R1B 15x #7  25x 2R1G, ER        2x10        2x10/10sec stretch        U squat 2R1B 20x #7  25x 2R1G, ER        2x10        2x10/10sec stretch        U squat 2R1B 20x #3  15x 2R1G        15x        15x/10sec stretch        U squat 2R1B 20x   Pelvic Stabilization   #3 2x10 #4 Bridges 2R1B on reformer #4 2x10 #3 2x10  No springs #3 2x10 #3 2x10 #4 2R1B on reformer    #12 Standing 1R1Y 2x10 8# 2x10 #8 15x #8 15x #11 2x10 #11 2x10 #7 2x10                       Trunk Stabilization #5 10x3 marches at a time    4x 8 sec with lat pull #6 1R 15x   #6 15x #5 10x3 marches at a time    Tabletop position practice 4x 8sec #5 lat pulls short yellow     4x 8sec #6 1R 15x          1R tabletop on reformer                        Hip Disassociation #8 2R 2x10 5# 2R 2x10 #5 2x10 2R #5 2x10 2R1B #8 2x10 2R1B #8 2x10 2R   #5 2x10 2R Arcs, V's circles         #4 Trap table marches and biking long yellow spring around thigh 12x #4 Trap table marches and biking long yellow spring around thigh 12x #4 Trap table marches and biking long yellow spring around thigh 12x  #             Scapular Stabilization #11 rows 2R 2x10 #1 seated on box  Biceps, rows 1R1B  Ext 1R #1 seated on box  Biceps, rows 1R1B  Ext 1R #1 seated on box  Biceps, rows 1R1B  Ext 1R   #1 seated on box  Biceps, rows 1R1B  Ext 1R                                 Thoracic Mobility #6 3B 15x  Further extension #2 1R 10x on reformer #2 1R 10x on reformer #2 10x on reformer #6 3B 10x   #6 3B 10x #2 1R 10x on reformer                                 General ROM Hamstring 1 min #7 1 min #7 1 min #7 1 min #9 1 min with ER #9 1 min 1 min    #10 post capsule    #10 post capsule #10 post capsule  1 min    Psoas 1R 1 min   Did not feel stretch as well       mermaid             Other  #1 posterior pelvic tilt 10x  #2 SB knee to chest 15x   #1 posterior pelvic tilt 10x  #2 SB knee to chest 15x #1 posterior pelvic tilt 10x  #2 SB knee to chest 15x                               Summary/Comments HEP: bridges, laying flat hamstring stretches  Continue to progress Access Code: [email or web address]  URL: [email or web address]  Date: 07/27/2021  Prepared by: Ari Samaniego    Exercises  Supine Posterior Pelvic Tilt - 1 x daily - 7 x weekly - 2 sets - 10 reps  Supine Hip and Knee Flexion AROM with Swiss Ball - 1 x daily - 7 x weekly - 2 sets - 10 reps  Seated Thoracic Extension AROM - 1 x daily - 7 x weekly - 1 sets - 10 reps  Cat-Camel - 1 x daily - 7 x weekly - 1 sets - 10 reps                                         Electronically signed by:  Ari Samaniego

## 2022-11-15 ENCOUNTER — HOSPITAL ENCOUNTER (OUTPATIENT)
Dept: PHYSICAL THERAPY | Age: 62
Discharge: HOME | End: 2022-11-15

## 2022-11-15 NOTE — FLOWSHEET NOTE
[facility]  [address]  Phone: [phone]  Fax [phone]      Date:  11/15/2022    Patient Name:  Marvin Ricketts    :  1960  MRN: [id number]  Restrictions/Precautions:    Medical/Treatment Diagnosis Information:   none - general strengthening     Physician Information:   none    Patient is Post-Op [] Yes   [] No     DOS:      Charge Package $210: 21, 3/8/22, 22, 9/20/22     10/4/22 10/25/22 11/1/22 11/15/22      Subjective Doing well Doing well Doing well Doing well                           4/7 5/7 6/7 7/7 1/7 2/7 3/7   Objective Hamstring 90    [id number]   Starting with upper body work and then reformer. Goals 1.  Decrease back pain, TA strengthening                   Reformer Exercises    Lumbar Roll    3 folded towels under head with headrest up #7  15x 2R1G        15x        15x/10sec stretch        U squat 2R1B 20x #3  15x 2R1G        15x        15x/10sec stretch        U squat 2R1B 20x #7  2x10 2R1G        2x10        2x10        U squat 2R1B 15x   #3  2x10 2R1G        2x10        2x10        U squat 2R1B 15x #7  25x 2R1G, ER        2x10        2x10/10sec stretch        U squat 2R1B 20x #7  25x 2R1G, ER        2x10        2x10/10sec stretch        U squat 2R1B 20x #3  15x 2R1G        15x        15x/10sec stretch        U squat 2R1B 20x   Pelvic Stabilization   #3 2x10 #4 Bridges 2R1B on reformer #4 2x10 #3 2x10  No springs #3 2x10 #3 2x10 #4 2R1B on reformer    #12 Standing 1R1Y 2x10 8# 2x10 #8 15x #8 15x #11 2x10 #11 2x10 #7 2x10                       Trunk Stabilization #5 10x3 marches at a time    4x 8 sec with lat pull #6 1R 15x   #6 15x #5 10x3 marches at a time    Tabletop position practice 4x 8sec #5 lat pulls short yellow     4x 8sec #6 1R 15x          1R tabletop on reformer                        Hip Disassociation #8 2R 2x10 5# 2R 2x10 #5 2x10 2R #5 2x10 2R1B #8 2x10 2R1B #8 2x10 2R   #5 2x10 2R     Arcs, V's circles         #4 Trap table marches and biking long yellow spring around thigh 12x #4 Trap table marches and biking long yellow spring around thigh 12x #4 Trap table marches and biking long yellow spring around thigh 12x  #             Scapular Stabilization #11 rows 2R 2x10 #1 seated on box  Biceps, rows 1R1B  Ext 1R #1 seated on box  Biceps, rows 1R1B  Ext 1R #1 seated on box  Biceps, rows 1R1B  Ext 1R   #1 seated on box  Biceps, rows 1R1B  Ext 1R                                 Thoracic Mobility #6 3B 15x  Further extension #2 1R 10x on reformer #2 1R 10x on reformer #2 10x on reformer #6 3B 10x   #6 3B 10x #2 1R 10x on reformer                                 General ROM Hamstring 1 min #7 1 min #7 1 min #7 1 min #9 1 min with ER #9 1 min 1 min    #10 post capsule    #10 post capsule #10 post capsule  1 min    Psoas 1R 1 min   Did not feel stretch as well       mermaid             Other  #1 posterior pelvic tilt 10x  #2 SB knee to chest 15x   #1 posterior pelvic tilt 10x  #2 SB knee to chest 15x #1 posterior pelvic tilt 10x  #2 SB knee to chest 15x                               Summary/Comments HEP: bridges, laying flat hamstring stretches  Continue to progress Access Code: [email or web address]  URL: [email or web address]  Date: 07/27/2021  Prepared by: Hemant Mosley    Exercises  Supine Posterior Pelvic Tilt - 1 x daily - 7 x weekly - 2 sets - 10 reps  Supine Hip and Knee Flexion AROM with Swiss Ball - 1 x daily - 7 x weekly - 2 sets - 10 reps  Seated Thoracic Extension AROM - 1 x daily - 7 x weekly - 1 sets - 10 reps  Cat-Camel - 1 x daily - 7 x weekly - 1 sets - 10 reps                                         Electronically signed by:  Hemant Mosley

## 2022-11-29 ENCOUNTER — HOSPITAL ENCOUNTER (OUTPATIENT)
Dept: PHYSICAL THERAPY | Age: 62
Discharge: HOME | End: 2022-11-29

## 2022-11-29 PROCEDURE — 9990000029 HC GAP PACKAGE: Performed by: SPECIALIST/TECHNOLOGIST

## 2022-11-29 NOTE — FLOWSHEET NOTE
[facility], [address]  Phone: [phone]  Fax [phone]      Date:  2022    Patient Name:  Simone Houston    :  1960  MRN: [id number]  Restrictions/Precautions:    Medical/Treatment Diagnosis Information:   none - general strengthening     Physician Information:   none    Patient is Post-Op [] Yes   [] No     DOS:      Charge Package $210: 21, 3/8/22, 22, 22, 11/29/22     10/4/22 10/25/22 11/1/22 11/15/22 11/29/22     Subjective Doing well Doing well Doing well Doing well Doing well                          4/7 5/7 6/7 7/7 1/7 2/7 3/7   Objective Hamstring 90   HEP Nenita Reyess  [id number]   Starting with upper body work and then reformer. Goals 1.  Decrease back pain, TA strengthening                   Reformer Exercises    Lumbar Roll    3 folded towels under head with headrest up #7  15x 2R1G        15x        15x/10sec stretch        U squat 2R1B 20x #3  15x 2R1G        15x        15x/10sec stretch        U squat 2R1B 20x #7  2x10 2R1G        2x10        2x10        U squat 2R1B 15x   #3  2x10 2R1G        2x10        2x10        U squat 2R1B 15x #7  25x 2R1G, ER        2x10        2x10/10sec stretch        U squat 2R1B 20x #7  25x 2R1G, ER        2x10        2x10/10sec stretch        U squat 2R1B 20x #3  15x 2R1G        15x        15x/10sec stretch        U squat 2R1B 20x   Pelvic Stabilization   #3 2x10 #4 Bridges 2R1B on reformer #4 2x10 #3 2x10  No springs #3 2x10 #3 2x10 #4 2R1B on reformer    #12 Standing 1R1Y 2x10 8# 2x10 #8 15x #8 15x #11 2x10 #11 2x10 #7 2x10                       Trunk Stabilization #5 10x3 marches at a time    4x 8 sec with lat pull #6 1R 15x   #6 15x #5 10x3 marches at a time    Tabletop position practice 4x 8sec #5 lat pulls short yellow     4x 8sec #6 1R 15x          1R tabletop on reformer                        Hip Disassociation #8 2R 2x10 5# 2R 2x10 #5 2x10 2R #5 2x10 2R1B #8 2x10 2R1B #8 2x10 2R   #5 2x10 2R     Arcs, V's circles         #4 Trap table marches and biking long yellow spring around thigh 12x #4 Trap table marches and biking long yellow spring around thigh 12x #4 Trap table marches and biking long yellow spring around thigh 12x  #             Scapular Stabilization #11 rows 2R 2x10 #1 seated on box  Biceps, rows 1R1B  Ext 1R #1 seated on box  Biceps, rows 1R1B  Ext 1R #1 seated on box  Biceps, rows 1R1B  Ext 1R   #1 seated on box  Biceps, rows 1R1B  Ext 1R                                 Thoracic Mobility #6 3B 15x  Further extension #2 1R 10x on reformer #2 1R 10x on reformer #2 10x on reformer #6 3B 10x   #6 3B 10x #2 1R 10x on reformer                                 General ROM Hamstring 1 min #7 1 min #7 1 min #7 1 min #9 1 min with ER #9 1 min 1 min    #10 post capsule    #10 post capsule #10 post capsule  1 min    Psoas 1R 1 min   Did not feel stretch as well       mermaid             Other  #1 posterior pelvic tilt 10x  #2 SB knee to chest 15x   #1 posterior pelvic tilt 10x  #2 SB knee to chest 15x #1 posterior pelvic tilt 10x  #2 SB knee to chest 15x                               Summary/Comments HEP: bridges, laying flat hamstring stretches  Continue to progress Access Code: [email or web address]  URL: [email or web address] com/  Date: 07/27/2021  Prepared by: Nabeel Marino    Exercises  Supine Posterior Pelvic Tilt - 1 x daily - 7 x weekly - 2 sets - 10 reps  Supine Hip and Knee Flexion AROM with Swiss Ball - 1 x daily - 7 x weekly - 2 sets - 10 reps  Seated Thoracic Extension AROM - 1 x daily - 7 x weekly - 1 sets - 10 reps  Cat-Camel - 1 x daily - 7 x weekly - 1 sets - 10 reps                                         Electronically signed by:  Nabeel Marion

## 2022-12-06 ENCOUNTER — HOSPITAL ENCOUNTER (OUTPATIENT)
Dept: PHYSICAL THERAPY | Age: 62
Discharge: HOME | End: 2022-12-06

## 2022-12-06 NOTE — FLOWSHEET NOTE
[facility], [address]  Phone: [phone]  Fax [phone]      Date:  2022    Patient Name:  Simone Houston    :  1960  MRN: [id number]  Restrictions/Precautions:    Medical/Treatment Diagnosis Information:   none - general strengthening     Physician Information:   none    Patient is Post-Op [] Yes   [] No     DOS:      Charge Package $210: 21, 3/8/22, 22, 22, 11/29/22     10/4/22 10/25/22 11/1/22 11/15/22 11/29/22 12/6/22    Subjective Doing well Doing well Doing well Doing well Doing well Doing well                         4/7 5/7 6/7 7/7 1/7 2/7 3/7   Objective Hamstring 90   HEP Nenita Ivan  [id number]   Starting with upper body work and then reformer. Goals 1.  Decrease back pain, TA strengthening                   Reformer Exercises    Lumbar Roll    3 folded towels under head with headrest up #7  15x 2R1G        15x        15x/10sec stretch        U squat 2R1B 20x #3  15x 2R1G        15x        15x/10sec stretch        U squat 2R1B 20x #7  2x10 2R1G        2x10        2x10        U squat 2R1B 15x   #3  2x10 2R1G        2x10        2x10        U squat 2R1B 15x #7  25x 2R1G, ER        2x10        2x10/10sec stretch        U squat 2R1B 20x #7  25x 2R1G, ER        2x10        2x10/10sec stretch        U squat 2R1B 20x #3  15x 2R1G        15x        15x/10sec stretch        U squat 2R1B 20x   Pelvic Stabilization   #3 2x10 #4 Bridges 2R1B on reformer #4 2x10 #3 2x10  No springs #3 2x10 #3 2x10 #4 2R1B on reformer    #12 Standing 1R1Y 2x10 8# 2x10 #8 15x #8 15x #11 2x10 #11 2x10 #7 2x10                       Trunk Stabilization #5 10x3 marches at a time    4x 8 sec with lat pull #6 1R 15x   #6 15x #5 10x3 marches at a time    Tabletop position practice 4x 8sec #5 lat pulls short yellow     4x 8sec #6 1R 15x          1R tabletop on reformer                        Hip Disassociation #8 2R 2x10 5# 2R 2x10 #5 2x10 2R #5 2x10 2R1B #8 2x10 2R1B #8 2x10 2R   #5 2x10 2R     Arcs, V's circles         #4 Trap table marches and biking long yellow spring around thigh 12x #4 Trap table marches and biking long yellow spring around thigh 12x #4 Trap table marches and biking long yellow spring around thigh 12x  #             Scapular Stabilization #11 rows 2R 2x10 #1 seated on box  Biceps, rows 1R1B  Ext 1R #1 seated on box  Biceps, rows 1R1B  Ext 1R #1 seated on box  Biceps, rows 1R1B  Ext 1R   #1 seated on box  Biceps, rows 1R1B  Ext 1R                                 Thoracic Mobility #6 3B 15x  Further extension #2 1R 10x on reformer #2 1R 10x on reformer #2 10x on reformer #6 3B 10x   #6 3B 10x #2 1R 10x on reformer                                 General ROM Hamstring 1 min #7 1 min #7 1 min #7 1 min #9 1 min with ER #9 1 min 1 min    #10 post capsule    #10 post capsule #10 post capsule  1 min    Psoas 1R 1 min   Did not feel stretch as well       mermaid             Other  #1 posterior pelvic tilt 10x  #2 SB knee to chest 15x   #1 posterior pelvic tilt 10x  #2 SB knee to chest 15x #1 posterior pelvic tilt 10x  #2 SB knee to chest 15x                               Summary/Comments HEP: bridges, laying flat hamstring stretches  Continue to progress Access Code: [email or web address]  URL: [email or web address]/  Date: 07/27/2021  Prepared by: Cesar Larsen    Exercises  Supine Posterior Pelvic Tilt - 1 x daily - 7 x weekly - 2 sets - 10 reps  Supine Hip and Knee Flexion AROM with Swiss Ball - 1 x daily - 7 x weekly - 2 sets - 10 reps  Seated Thoracic Extension AROM - 1 x daily - 7 x weekly - 1 sets - 10 reps  Cat-Camel - 1 x daily - 7 x weekly - 1 sets - 10 reps                                         Electronically signed by:  Cesar Larsen

## 2022-12-13 ENCOUNTER — HOSPITAL ENCOUNTER (OUTPATIENT)
Dept: PHYSICAL THERAPY | Age: 62
Discharge: HOME | End: 2022-12-13

## 2022-12-13 NOTE — FLOWSHEET NOTE
[facility] Giuliano Tay  [address]  Phone: [phone]  Fax [phone]      Date:  2022    Patient Name:  Curly Villasenor    :  1960  MRN: [id number]  Restrictions/Precautions:    Medical/Treatment Diagnosis Information:   none - general strengthening     Physician Information:   none    Patient is Post-Op [] Yes   [] No     DOS:      Charge Package $210: 21, 3/8/22, 22, 22, 11/29/22     10/4/22 10/25/22 11/1/22 11/15/22 11/29/22 12/6/22 12/13/22   Subjective Doing well Doing well Doing well Doing well Doing well Doing well Doing well                        4/7 5/7 6/7 7/7 1/7 2/7 3/7   Objective Hamstring 90    [address]   Starting with upper body work and then reformer. Goals 1.  Decrease back pain, TA strengthening                   Reformer Exercises    Lumbar Roll    3 folded towels under head with headrest up #7  15x 2R1G        15x        15x/10sec stretch        U squat 2R1B 20x #3  15x 2R1G        15x        15x/10sec stretch        U squat 2R1B 20x #7  2x10 2R1G        2x10        2x10        U squat 2R1B 15x   #3  2x10 2R1G        2x10        2x10        U squat 2R1B 15x #7  25x 2R1G, ER        2x10        2x10/10sec stretch        U squat 2R1B 20x #7  25x 2R1G, ER        2x10        2x10/10sec stretch        U squat 2R1B 20x #3  15x 2R1G        15x        15x/10sec stretch        U squat 2R1B 20x   Pelvic Stabilization   #3 2x10 #4 Bridges 2R1B on reformer #4 2x10 #3 2x10  No springs #3 2x10 #3 2x10 #4 2R1B on reformer    #12 Standing 1R1Y 2x10 8# 2x10 #8 15x #8 15x #11 2x10 #11 2x10 #7 2x10                       Trunk Stabilization #5 10x3 marches at a time    4x 8 sec with lat pull #6 1R 15x   #6 15x #5 10x3 marches at a time    Tabletop position practice 4x 8sec #5 lat pulls short yellow     4x 8sec #6 1R 15x          1R tabletop on reformer                        Hip Disassociation #8 2R 2x10 5# 2R 2x10 #5 2x10 2R #5 2x10 2R1B #8 2x10 2R1B #8 2x10 2R   #5 2x10 2R     Arcs, V's circles         #4 Trap table marches and biking long yellow spring around thigh 12x #4 Trap table marches and biking long yellow spring around thigh 12x #4 Trap table marches and biking long yellow spring around thigh 12x  #             Scapular Stabilization #11 rows 2R 2x10 #1 seated on box  Biceps, rows 1R1B  Ext 1R #1 seated on box  Biceps, rows 1R1B  Ext 1R #1 seated on box  Biceps, rows 1R1B  Ext 1R   #1 seated on box  Biceps, rows 1R1B  Ext 1R                                 Thoracic Mobility #6 3B 15x  Further extension #2 1R 10x on reformer #2 1R 10x on reformer #2 10x on reformer #6 3B 10x   #6 3B 10x #2 1R 10x on reformer                                 General ROM Hamstring 1 min #7 1 min #7 1 min #7 1 min #9 1 min with ER #9 1 min 1 min    #10 post capsule    #10 post capsule #10 post capsule  1 min    Psoas 1R 1 min   Did not feel stretch as well       mermaid             Other  #1 posterior pelvic tilt 10x  #2 SB knee to chest 15x   #1 posterior pelvic tilt 10x  #2 SB knee to chest 15x #1 posterior pelvic tilt 10x  #2 SB knee to chest 15x                               Summary/Comments HEP: bridges, laying flat hamstring stretches  Continue to progress Access Code: [email or web address]  URL: [email or web address]  Date: 07/27/2021  Prepared by: Ambika Matt    Exercises  Supine Posterior Pelvic Tilt - 1 x daily - 7 x weekly - 2 sets - 10 reps  Supine Hip and Knee Flexion AROM with Swiss Ball - 1 x daily - 7 x weekly - 2 sets - 10 reps  Seated Thoracic Extension AROM - 1 x daily - 7 x weekly - 1 sets - 10 reps  Cat-Camel - 1 x daily - 7 x weekly - 1 sets - 10 reps                                         Electronically signed by:  Ambika Matt

## 2023-01-03 ENCOUNTER — HOSPITAL ENCOUNTER (OUTPATIENT)
Dept: PHYSICAL THERAPY | Age: 63
Discharge: HOME | End: 2023-01-03

## 2023-01-03 NOTE — FLOWSHEET NOTE
[facility], [address]  Phone: [phone]  Fax [phone]      Date:  1/3/2023    Patient Name:  Kalyn Tam    :  1960  MRN: [id number]  Restrictions/Precautions:    Medical/Treatment Diagnosis Information:   none - general strengthening     Physician Information:   none    Patient is Post-Op [] Yes   [] No     DOS:      Charge Package $210: 21, 3/8/22, 22, 22, 11/29/22     1/3/23         Subjective Doing well                              6/7 7/7 1/7 2/7 3/7 4/7 5/7   Objective Hamstring 90   HEP Sandie Patience  [id number]   Starting with upper body work and then reformer. Goals 1.  Decrease back pain, TA strengthening                   Reformer Exercises    Lumbar Roll    3 folded towels under head with headrest up #7  15x 2R1G        15x        15x/10sec stretch        U squat 2R1B 20x #3  15x 2R1G        15x        15x/10sec stretch        U squat 2R1B 20x #7  2x10 2R1G        2x10        2x10        U squat 2R1B 15x   #3  2x10 2R1G        2x10        2x10        U squat 2R1B 15x #7  25x 2R1G, ER        2x10        2x10/10sec stretch        U squat 2R1B 20x #7  25x 2R1G, ER        2x10        2x10/10sec stretch        U squat 2R1B 20x #3  15x 2R1G        15x        15x/10sec stretch        U squat 2R1B 20x   Pelvic Stabilization   #3 2x10 #4 Bridges 2R1B on reformer #4 2x10 #3 2x10  No springs #3 2x10 #3 2x10 #4 2R1B on reformer    #12 Standing 1R1Y 2x10 8# 2x10 #8 15x #8 15x #11 2x10 #11 2x10 #7 2x10                       Trunk Stabilization #5 10x3 marches at a time    4x 8 sec with lat pull #6 1R 15x   #6 15x #5 10x3 marches at a time    Tabletop position practice 4x 8sec #5 lat pulls short yellow     4x 8sec #6 1R 15x          1R tabletop on reformer                        Hip Disassociation #8 2R 2x10 5# 2R 2x10 #5 2x10 2R #5 2x10 2R1B #8 2x10 2R1B #8 2x10 2R   #5 2x10 2R     Arcs, V's circles #4 Trap table marches and biking long yellow spring around thigh 12x #4 Trap table marches and biking long yellow spring around thigh 12x #4 Trap table marches and biking long yellow spring around thigh 12x  #             Scapular Stabilization #11 rows 2R 2x10 #1 seated on box  Biceps, rows 1R1B  Ext 1R #1 seated on box  Biceps, rows 1R1B  Ext 1R #1 seated on box  Biceps, rows 1R1B  Ext 1R   #1 seated on box  Biceps, rows 1R1B  Ext 1R                                 Thoracic Mobility #6 3B 15x  Further extension #2 1R 10x on reformer #2 1R 10x on reformer #2 10x on reformer #6 3B 10x   #6 3B 10x #2 1R 10x on reformer                                 General ROM Hamstring 1 min #7 1 min #7 1 min #7 1 min #9 1 min with ER #9 1 min 1 min    #10 post capsule    #10 post capsule #10 post capsule  1 min    Psoas 1R 1 min   Did not feel stretch as well       mermaid             Other  #1 posterior pelvic tilt 10x  #2 SB knee to chest 15x   #1 posterior pelvic tilt 10x  #2 SB knee to chest 15x #1 posterior pelvic tilt 10x  #2 SB knee to chest 15x                               Summary/Comments HEP: bridges, laying flat hamstring stretches  Continue to progress Access Code: [email or web address]  URL: [email or web address]  Date: 07/27/2021  Prepared by: Marquis Lal    Exercises  Supine Posterior Pelvic Tilt - 1 x daily - 7 x weekly - 2 sets - 10 reps  Supine Hip and Knee Flexion AROM with Swiss Ball - 1 x daily - 7 x weekly - 2 sets - 10 reps  Seated Thoracic Extension AROM - 1 x daily - 7 x weekly - 1 sets - 10 reps  Cat-Camel - 1 x daily - 7 x weekly - 1 sets - 10 reps                                         Electronically signed by:  Marquis Lal

## 2023-01-10 ENCOUNTER — HOSPITAL ENCOUNTER (OUTPATIENT)
Dept: PHYSICAL THERAPY | Age: 63
Discharge: HOME | End: 2023-01-10

## 2023-01-10 NOTE — FLOWSHEET NOTE
[address]  Phone: [phone]  Fax [phone]      Date:  1/10/2023    Patient Name:  Curly Villasenor    :  1960  MRN: [id number]  Restrictions/Precautions:    Medical/Treatment Diagnosis Information:   none - general strengthening     Physician Information:   none    Patient is Post-Op [] Yes   [] No     DOS:      Charge Package $210: 21, 3/8/22, 22, 22, 11/29/22     1/3/23 1/10/23        Subjective Doing well Doing well                             6/7 7/7 1/7 2/7 3/7 4/7 5/7   Objective Hamstring 90   HEP Ruffus Shorten  [id number]   Starting with upper body work and then reformer. Goals 1.  Decrease back pain, TA strengthening                   Reformer Exercises    Lumbar Roll    3 folded towels under head with headrest up #7  15x 2R1G        15x        15x/10sec stretch        U squat 2R1B 20x #3  15x 2R1G        15x        15x/10sec stretch        U squat 2R1B 20x #7  2x10 2R1G        2x10        2x10        U squat 2R1B 15x   #3  2x10 2R1G        2x10        2x10        U squat 2R1B 15x #7  25x 2R1G, ER        2x10        2x10/10sec stretch        U squat 2R1B 20x #7  25x 2R1G, ER        2x10        2x10/10sec stretch        U squat 2R1B 20x #3  15x 2R1G        15x        15x/10sec stretch        U squat 2R1B 20x   Pelvic Stabilization   #3 2x10 #4 Bridges 2R1B on reformer #4 2x10 #3 2x10  No springs #3 2x10 #3 2x10 #4 2R1B on reformer    #12 Standing 1R1Y 2x10 8# 2x10 #8 15x #8 15x #11 2x10 #11 2x10 #7 2x10                       Trunk Stabilization #5 10x3 marches at a time    4x 8 sec with lat pull #6 1R 15x   #6 15x #5 10x3 marches at a time    Tabletop position practice 4x 8sec #5 lat pulls short yellow     4x 8sec #6 1R 15x          1R tabletop on reformer                        Hip Disassociation #8 2R 2x10 5# 2R 2x10 #5 2x10 2R #5 2x10 2R1B #8 2x10 2R1B #8 2x10 2R   #5 2x10 2R Arcs, V's circles         #4 Trap table marches and biking long yellow spring around thigh 12x #4 Trap table marches and biking long yellow spring around thigh 12x #4 Trap table marches and biking long yellow spring around thigh 12x  #             Scapular Stabilization #11 rows 2R 2x10 #1 seated on box  Biceps, rows 1R1B  Ext 1R #1 seated on box  Biceps, rows 1R1B  Ext 1R #1 seated on box  Biceps, rows 1R1B  Ext 1R   #1 seated on box  Biceps, rows 1R1B  Ext 1R                                 Thoracic Mobility #6 3B 15x  Further extension #2 1R 10x on reformer #2 1R 10x on reformer #2 10x on reformer #6 3B 10x   #6 3B 10x #2 1R 10x on reformer                                 General ROM Hamstring 1 min #7 1 min #7 1 min #7 1 min #9 1 min with ER #9 1 min 1 min    #10 post capsule    #10 post capsule #10 post capsule  1 min    Psoas 1R 1 min   Did not feel stretch as well       mermaid             Other  #1 posterior pelvic tilt 10x  #2 SB knee to chest 15x   #1 posterior pelvic tilt 10x  #2 SB knee to chest 15x #1 posterior pelvic tilt 10x  #2 SB knee to chest 15x                               Summary/Comments HEP: bridges, laying flat hamstring stretches  Continue to progress Access Code: [email or web address]  URL: [email or web address]  Date: 07/27/2021  Prepared by: Rochelle Hernandez    Exercises  Supine Posterior Pelvic Tilt - 1 x daily - 7 x weekly - 2 sets - 10 reps  Supine Hip and Knee Flexion AROM with Swiss Ball - 1 x daily - 7 x weekly - 2 sets - 10 reps  Seated Thoracic Extension AROM - 1 x daily - 7 x weekly - 1 sets - 10 reps  Cat-Camel - 1 x daily - 7 x weekly - 1 sets - 10 reps                                         Electronically signed by:  Rochelle Hernandez

## 2023-01-24 ENCOUNTER — HOSPITAL ENCOUNTER (OUTPATIENT)
Dept: PHYSICAL THERAPY | Age: 63
Discharge: HOME | End: 2023-01-24

## 2023-01-24 NOTE — FLOWSHEET NOTE
[address], [address] [place] [address] Road  Phone: [phone]  Fax [phone]      Date:  2023    Patient Name:  Demi Dia    :  1960  MRN: [id number]  Restrictions/Precautions:    Medical/Treatment Diagnosis Information:   none - general strengthening     Physician Information:   none    Patient is Post-Op [] Yes   [] No     DOS:      Charge Package $210: 21, 3/8/22, 22, 22, 22, 1   1/3/23 1/10/23 1/24/23       Subjective Doing well Doing well Doing well                            6/7 7/7 1/7 2/7 3/7 4/7 5/7   Objective Hamstring 90   HEP Little Soco  [id number]   Starting with upper body work and then reformer. Goals 1.  Decrease back pain, TA strengthening                   Reformer Exercises    Lumbar Roll    3 folded towels under head with headrest up #7  15x 2R1G        15x        15x/10sec stretch        U squat 2R1B 20x #3  15x 2R1G        15x        15x/10sec stretch        U squat 2R1B 20x #7  2x10 2R1G        2x10        2x10        U squat 2R1B 15x   #3  2x10 2R1G        2x10        2x10        U squat 2R1B 15x #7  25x 2R1G, ER        2x10        2x10/10sec stretch        U squat 2R1B 20x #7  25x 2R1G, ER        2x10        2x10/10sec stretch        U squat 2R1B 20x #3  15x 2R1G        15x        15x/10sec stretch        U squat 2R1B 20x   Pelvic Stabilization   #3 2x10 #4 Bridges 2R1B on reformer #4 2x10 #3 2x10  No springs #3 2x10 #3 2x10 #4 2R1B on reformer    #12 Standing 1R1Y 2x10 8# 2x10 #8 15x #8 15x #11 2x10 #11 2x10 #7 2x10                       Trunk Stabilization #5 10x3 marches at a time    4x 8 sec with lat pull #6 1R 15x   #6 15x #5 10x3 marches at a time    Tabletop position practice 4x 8sec #5 lat pulls short yellow     4x 8sec #6 1R 15x          1R tabletop on reformer                        Hip Disassociation #8 2R 2x10 5# 2R 2x10 #5 2x10 2R #5 2x10 2R1B #8 2x10 2R1B #8 2x10 2R #5 2x10 2R     Arcs, V's circles         #4 Trap table marches and biking long yellow spring around thigh 12x #4 Trap table marches and biking long yellow spring around thigh 12x #4 Trap table marches and biking long yellow spring around thigh 12x  #             Scapular Stabilization #11 rows 2R 2x10 #1 seated on box  Biceps, rows 1R1B  Ext 1R #1 seated on box  Biceps, rows 1R1B  Ext 1R #1 seated on box  Biceps, rows 1R1B  Ext 1R   #1 seated on box  Biceps, rows 1R1B  Ext 1R                                 Thoracic Mobility #6 3B 15x  Further extension #2 1R 10x on reformer #2 1R 10x on reformer #2 10x on reformer #6 3B 10x   #6 3B 10x #2 1R 10x on reformer                                 General ROM Hamstring 1 min #7 1 min #7 1 min #7 1 min #9 1 min with ER #9 1 min 1 min    #10 post capsule    #10 post capsule #10 post capsule  1 min    Psoas 1R 1 min   Did not feel stretch as well       mermaid             Other  #1 posterior pelvic tilt 10x  #2 SB knee to chest 15x   #1 posterior pelvic tilt 10x  #2 SB knee to chest 15x #1 posterior pelvic tilt 10x  #2 SB knee to chest 15x                               Summary/Comments HEP: bridges, laying flat hamstring stretches  Continue to progress Access Code: [email or web address]  URL: [email or web address]  Date: 07/27/2021  Prepared by: Viviana Canada    Exercises  Supine Posterior Pelvic Tilt - 1 x daily - 7 x weekly - 2 sets - 10 reps  Supine Hip and Knee Flexion AROM with Swiss Ball - 1 x daily - 7 x weekly - 2 sets - 10 reps  Seated Thoracic Extension AROM - 1 x daily - 7 x weekly - 1 sets - 10 reps  Cat-Camel - 1 x daily - 7 x weekly - 1 sets - 10 reps                                         Electronically signed by:  Viviana Canada

## 2023-02-07 ENCOUNTER — HOSPITAL ENCOUNTER (OUTPATIENT)
Dept: PHYSICAL THERAPY | Age: 63
Discharge: HOME | End: 2023-02-07

## 2023-02-07 NOTE — FLOWSHEET NOTE
[facility], [address]  Phone: [phone]  Fax [phone]      Date:  2023    Patient Name:  Javy Disla    :  1960  MRN: [id number]  Restrictions/Precautions:    Medical/Treatment Diagnosis Information:   none - general strengthening     Physician Information:   none    Patient is Post-Op [] Yes   [] No     DOS:      Charge Package $210: 21, 3/8/22, 22, 22, 22, 1   1/3/23 1/10/23 1/24/23 2/7/23      Subjective Doing well Doing well Doing well Doing well                           6/7 7/7 1/7 2/7 3/7 4/7 5/7   Objective Hamstring 90   HEP Ajit   [id number]   Starting with upper body work and then reformer. Goals 1.  Decrease back pain, TA strengthening                   Reformer Exercises    Lumbar Roll    3 folded towels under head with headrest up #7  15x 2R1G        15x        15x/10sec stretch        U squat 2R1B 20x #3  15x 2R1G        15x        15x/10sec stretch        U squat 2R1B 20x #7  2x10 2R1G        2x10        2x10        U squat 2R1B 15x   #3  2x10 2R1G        2x10        2x10        U squat 2R1B 15x #7  25x 2R1G, ER        2x10        2x10/10sec stretch        U squat 2R1B 20x #7  25x 2R1G, ER        2x10        2x10/10sec stretch        U squat 2R1B 20x #3  15x 2R1G        15x        15x/10sec stretch        U squat 2R1B 20x   Pelvic Stabilization   #3 2x10 #4 Bridges 2R1B on reformer #4 2x10 #3 2x10  No springs #3 2x10 #3 2x10 #4 2R1B on reformer    #12 Standing 1R1Y 2x10 8# 2x10 #8 15x #8 15x #11 2x10 #11 2x10 #7 2x10                       Trunk Stabilization #5 10x3 marches at a time    4x 8 sec with lat pull #6 1R 15x   #6 15x #5 10x3 marches at a time    Tabletop position practice 4x 8sec #5 lat pulls short yellow     4x 8sec #6 1R 15x          1R tabletop on reformer                        Hip Disassociation #8 2R 2x10 5# 2R 2x10 #5 2x10 2R #5 2x10 2R1B #8 2x10 2R1B #8 2x10 2R   #5 2x10 2R     Arcs, V's circles         #4 Trap table marches and biking long yellow spring around thigh 12x #4 Trap table marches and biking long yellow spring around thigh 12x #4 Trap table marches and biking long yellow spring around thigh 12x  #             Scapular Stabilization #11 rows 2R 2x10 #1 seated on box  Biceps, rows 1R1B  Ext 1R #1 seated on box  Biceps, rows 1R1B  Ext 1R #1 seated on box  Biceps, rows 1R1B  Ext 1R   #1 seated on box  Biceps, rows 1R1B  Ext 1R                                 Thoracic Mobility #6 3B 15x  Further extension #2 1R 10x on reformer #2 1R 10x on reformer #2 10x on reformer #6 3B 10x   #6 3B 10x #2 1R 10x on reformer                                 General ROM Hamstring 1 min #7 1 min #7 1 min #7 1 min #9 1 min with ER #9 1 min 1 min    #10 post capsule    #10 post capsule #10 post capsule  1 min    Psoas 1R 1 min   Did not feel stretch as well       mermaid             Other  #1 posterior pelvic tilt 10x  #2 SB knee to chest 15x   #1 posterior pelvic tilt 10x  #2 SB knee to chest 15x #1 posterior pelvic tilt 10x  #2 SB knee to chest 15x                               Summary/Comments HEP: bridges, laying flat hamstring stretches  Continue to progress Access Code: [email or web address]  URL: [email or web address]  Date: 07/27/2021  Prepared by: Andrea Verde    Exercises  Supine Posterior Pelvic Tilt - 1 x daily - 7 x weekly - 2 sets - 10 reps  Supine Hip and Knee Flexion AROM with Swiss Ball - 1 x daily - 7 x weekly - 2 sets - 10 reps  Seated Thoracic Extension AROM - 1 x daily - 7 x weekly - 1 sets - 10 reps  Cat-Camel - 1 x daily - 7 x weekly - 1 sets - 10 reps                                         Electronically signed by:  Andrea Verde

## 2023-02-14 ENCOUNTER — HOSPITAL ENCOUNTER (OUTPATIENT)
Dept: PHYSICAL THERAPY | Age: 63
Discharge: HOME | End: 2023-02-14

## 2023-02-14 NOTE — FLOWSHEET NOTE
[address]  Phone: [phone]  Fax [phone]      Date:  2023    Patient Name:  Kaylyn Howard    :  1960  MRN: [id number]  Restrictions/Precautions:    Medical/Treatment Diagnosis Information:   none - general strengthening     Physician Information:   none    Patient is Post-Op [] Yes   [] No     DOS:      Charge Package $210: 21, 3/8/22, 22, 22, 22, 1   1/3/23 1/10/23 1/24/23 2/7/23 2/14/23     Subjective Doing well Doing well Doing well Doing well Doing well                          6/7 7/7 1/7 2/7 3/7 4/7 5/7   Objective Hamstring 90   HEP Lizette Paget  [id number]   Starting with upper body work and then reformer. Goals 1.  Decrease back pain, TA strengthening                   Reformer Exercises    Lumbar Roll    3 folded towels under head with headrest up #7  15x 2R1G        15x        15x/10sec stretch        U squat 2R1B 20x #3  15x 2R1G        15x        15x/10sec stretch        U squat 2R1B 20x #7  2x10 2R1G        2x10        2x10        U squat 2R1B 15x   #3  2x10 2R1G        2x10        2x10        U squat 2R1B 15x #7  25x 2R1G, ER        2x10        2x10/10sec stretch        U squat 2R1B 20x #7  25x 2R1G, ER        2x10        2x10/10sec stretch        U squat 2R1B 20x #3  15x 2R1G        15x        15x/10sec stretch        U squat 2R1B 20x   Pelvic Stabilization   #3 2x10 #4 Bridges 2R1B on reformer #4 2x10 #3 2x10  No springs #3 2x10 #3 2x10 #4 2R1B on reformer    #12 Standing 1R1Y 2x10 8# 2x10 #8 15x #8 15x #11 2x10 #11 2x10 #7 2x10                       Trunk Stabilization #5 10x3 marches at a time    4x 8 sec with lat pull #6 1R 15x   #6 15x #5 10x3 marches at a time    Tabletop position practice 4x 8sec #5 lat pulls short yellow     4x 8sec #6 1R 15x          1R tabletop on reformer                        Hip Disassociation #8 2R 2x10 5# 2R 2x10 #5 2x10 2R #5 2x10 2R1B #8 2x10 2R1B #8 2x10 2R   #5 2x10 2R     Arcs, V's circles         #4 Trap table marches and biking long yellow spring around thigh 12x #4 Trap table marches and biking long yellow spring around thigh 12x #4 Trap table marches and biking long yellow spring around thigh 12x  #             Scapular Stabilization #11 rows 2R 2x10 #1 seated on box  Biceps, rows 1R1B  Ext 1R #1 seated on box  Biceps, rows 1R1B  Ext 1R #1 seated on box  Biceps, rows 1R1B  Ext 1R   #1 seated on box  Biceps, rows 1R1B  Ext 1R                                 Thoracic Mobility #6 3B 15x  Further extension #2 1R 10x on reformer #2 1R 10x on reformer #2 10x on reformer #6 3B 10x   #6 3B 10x #2 1R 10x on reformer                                 General ROM Hamstring 1 min #7 1 min #7 1 min #7 1 min #9 1 min with ER #9 1 min 1 min    #10 post capsule    #10 post capsule #10 post capsule  1 min    Psoas 1R 1 min   Did not feel stretch as well       mermaid             Other  #1 posterior pelvic tilt 10x  #2 SB knee to chest 15x   #1 posterior pelvic tilt 10x  #2 SB knee to chest 15x #1 posterior pelvic tilt 10x  #2 SB knee to chest 15x                               Summary/Comments HEP: bridges, laying flat hamstring stretches  Continue to progress Access Code: [email or web address]  URL: [email or web address]  Date: 07/27/2021  Prepared by: Carmen Magaña    Exercises  Supine Posterior Pelvic Tilt - 1 x daily - 7 x weekly - 2 sets - 10 reps  Supine Hip and Knee Flexion AROM with Swiss Ball - 1 x daily - 7 x weekly - 2 sets - 10 reps  Seated Thoracic Extension AROM - 1 x daily - 7 x weekly - 1 sets - 10 reps  Cat-Camel - 1 x daily - 7 x weekly - 1 sets - 10 reps                                         Electronically signed by:  Carmen Magaña

## 2023-02-28 ENCOUNTER — HOSPITAL ENCOUNTER (OUTPATIENT)
Dept: PHYSICAL THERAPY | Age: 63
Discharge: HOME | End: 2023-02-28

## 2023-02-28 NOTE — FLOWSHEET NOTE
[facility]  [address]  Phone: [phone]  Fax [phone]      Date:  2023    Patient Name:  Clifton Bonner    :  1960  MRN: [id number]  Restrictions/Precautions:    Medical/Treatment Diagnosis Information:   none - general strengthening     Physician Information:   none    Patient is Post-Op [] Yes   [] No     DOS:      Charge Package $210: 21, 3/8/22, 22, 22, 22, 1   1/3/23 1/10/23 1/24/23 2/7/23 2/14/23 2/28/23    Subjective Doing well Doing well Doing well Doing well Doing well Doing well                         6/7 7/7 1/7 2/7 3/7 4/7 5/7   Objective Hamstring 90   HEP Del Vasiliy  [id number]   Starting with upper body work and then reformer. Goals 1.  Decrease back pain, TA strengthening                   Reformer Exercises    Lumbar Roll    3 folded towels under head with headrest up #7  15x 2R1G        15x        15x/10sec stretch        U squat 2R1B 20x #3  15x 2R1G        15x        15x/10sec stretch        U squat 2R1B 20x #7  2x10 2R1G        2x10        2x10        U squat 2R1B 15x   #3  2x10 2R1G        2x10        2x10        U squat 2R1B 15x #7  25x 2R1G, ER        2x10        2x10/10sec stretch        U squat 2R1B 20x #7  25x 2R1G, ER        2x10        2x10/10sec stretch        U squat 2R1B 20x #3  15x 2R1G        15x        15x/10sec stretch        U squat 2R1B 20x   Pelvic Stabilization   #3 2x10 #4 Bridges 2R1B on reformer #4 2x10 #3 2x10  No springs #3 2x10 #3 2x10 #4 2R1B on reformer    #12 Standing 1R1Y 2x10 8# 2x10 #8 15x #8 15x #11 2x10 #11 2x10 #7 2x10                       Trunk Stabilization #5 10x3 marches at a time    4x 8 sec with lat pull #6 1R 15x   #6 15x #5 10x3 marches at a time    Tabletop position practice 4x 8sec #5 lat pulls short yellow     4x 8sec #6 1R 15x          1R tabletop on reformer                        Hip Disassociation #8 2R 2x10 5# 2R 2x10 #5 2x10 2R #5 2x10 2R1B #8 2x10 2R1B #8 2x10 2R   #5 2x10 2R     Arcs, V's circles         #4 Trap table marches and biking long yellow spring around thigh 12x #4 Trap table marches and biking long yellow spring around thigh 12x #4 Trap table marches and biking long yellow spring around thigh 12x  #             Scapular Stabilization #11 rows 2R 2x10 #1 seated on box  Biceps, rows 1R1B  Ext 1R #1 seated on box  Biceps, rows 1R1B  Ext 1R #1 seated on box  Biceps, rows 1R1B  Ext 1R   #1 seated on box  Biceps, rows 1R1B  Ext 1R                                 Thoracic Mobility #6 3B 15x  Further extension #2 1R 10x on reformer #2 1R 10x on reformer #2 10x on reformer #6 3B 10x   #6 3B 10x #2 1R 10x on reformer                                 General ROM Hamstring 1 min #7 1 min #7 1 min #7 1 min #9 1 min with ER #9 1 min 1 min    #10 post capsule    #10 post capsule #10 post capsule  1 min    Psoas 1R 1 min   Did not feel stretch as well       mermaid             Other  #1 posterior pelvic tilt 10x  #2 SB knee to chest 15x   #1 posterior pelvic tilt 10x  #2 SB knee to chest 15x #1 posterior pelvic tilt 10x  #2 SB knee to chest 15x                               Summary/Comments HEP: bridges, laying flat hamstring stretches  Continue to progress Access Code: [email or web address]  URL: [email or web address]  Date: 07/27/2021  Prepared by: Vika Ramos    Exercises  Supine Posterior Pelvic Tilt - 1 x daily - 7 x weekly - 2 sets - 10 reps  Supine Hip and Knee Flexion AROM with Swiss Ball - 1 x daily - 7 x weekly - 2 sets - 10 reps  Seated Thoracic Extension AROM - 1 x daily - 7 x weekly - 1 sets - 10 reps  Cat-Camel - 1 x daily - 7 x weekly - 1 sets - 10 reps                                         Electronically signed by:  Vika Ramos

## 2023-03-07 ENCOUNTER — HOSPITAL ENCOUNTER (OUTPATIENT)
Dept: PHYSICAL THERAPY | Age: 63
Discharge: HOME | End: 2023-03-07

## 2023-03-07 NOTE — FLOWSHEET NOTE
[facility], [address]  Phone: [phone]  Fax [phone]      Date:  3/7/2023    Patient Name:  Chasidy Le    :  1960  MRN: [id number]  Restrictions/Precautions:    Medical/Treatment Diagnosis Information:   none - general strengthening     Physician Information:   none    Patient is Post-Op [] Yes   [] No     DOS:      Charge Package $210: 21, 3/8/22, 22, 22, 22, 1   1/3/23 1/10/23 1/24/23 2/7/23 2/14/23 2/28/23 3/7/23   Subjective Doing well Doing well Doing well Doing well Doing well Doing well Doing well                        6/7 7/7 1/7 2/7 3/7 4/7 5/7   Objective Hamstring 90    [id number]   Starting with upper body work and then reformer. Goals 1.  Decrease back pain, TA strengthening                   Reformer Exercises    Lumbar Roll    3 folded towels under head with headrest up #7  15x 2R1G        15x        15x/10sec stretch        U squat 2R1B 20x #3  15x 2R1G        15x        15x/10sec stretch        U squat 2R1B 20x #7  2x10 2R1G        2x10        2x10        U squat 2R1B 15x   #3  2x10 2R1G        2x10        2x10        U squat 2R1B 15x #7  25x 2R1G, ER        2x10        2x10/10sec stretch        U squat 2R1B 20x #7  25x 2R1G, ER        2x10        2x10/10sec stretch        U squat 2R1B 20x #3  15x 2R1G        15x        15x/10sec stretch        U squat 2R1B 20x   Pelvic Stabilization   #3 2x10 #4 Bridges 2R1B on reformer #4 2x10 #3 2x10  No springs #3 2x10 #3 2x10 #4 2R1B on reformer    #12 Standing 1R1Y 2x10 8# 2x10 #8 15x #8 15x #11 2x10 #11 2x10 #7 2x10                       Trunk Stabilization #5 10x3 marches at a time    4x 8 sec with lat pull #6 1R 15x   #6 15x #5 10x3 marches at a time    Tabletop position practice 4x 8sec #5 lat pulls short yellow     4x 8sec #6 1R 15x          1R tabletop on reformer                        Hip Disassociation #8 2R 2x10 5# 2R 2x10 #5 2x10 2R #5 2x10 2R1B #8 2x10 2R1B #8 2x10 2R   #5 2x10 2R     Arcs, V's circles         #4 Trap table marches and biking long yellow spring around thigh 12x #4 Trap table marches and biking long yellow spring around thigh 12x #4 Trap table marches and biking long yellow spring around thigh 12x  #             Scapular Stabilization #11 rows 2R 2x10 #1 seated on box  Biceps, rows 1R1B  Ext 1R #1 seated on box  Biceps, rows 1R1B  Ext 1R #1 seated on box  Biceps, rows 1R1B  Ext 1R   #1 seated on box  Biceps, rows 1R1B  Ext 1R                                 Thoracic Mobility #6 3B 15x  Further extension #2 1R 10x on reformer #2 1R 10x on reformer #2 10x on reformer #6 3B 10x   #6 3B 10x #2 1R 10x on reformer                                 General ROM Hamstring 1 min #7 1 min #7 1 min #7 1 min #9 1 min with ER #9 1 min 1 min    #10 post capsule    #10 post capsule #10 post capsule  1 min    Psoas 1R 1 min   Did not feel stretch as well       mermaid             Other  #1 posterior pelvic tilt 10x  #2 SB knee to chest 15x   #1 posterior pelvic tilt 10x  #2 SB knee to chest 15x #1 posterior pelvic tilt 10x  #2 SB knee to chest 15x                               Summary/Comments HEP: bridges, laying flat hamstring stretches  Continue to progress Access Code: [email or web address]  URL: [email or web address] com/  Date: 07/27/2021  Prepared by: Jordin Duenas    Exercises  Supine Posterior Pelvic Tilt - 1 x daily - 7 x weekly - 2 sets - 10 reps  Supine Hip and Knee Flexion AROM with Swiss Ball - 1 x daily - 7 x weekly - 2 sets - 10 reps  Seated Thoracic Extension AROM - 1 x daily - 7 x weekly - 1 sets - 10 reps  Cat-Camel - 1 x daily - 7 x weekly - 1 sets - 10 reps                                         Electronically signed by:  Jordin Duenas

## 2023-03-14 ENCOUNTER — HOSPITAL ENCOUNTER (OUTPATIENT)
Dept: PHYSICAL THERAPY | Age: 63
Discharge: HOME | End: 2023-03-14

## 2023-03-14 NOTE — FLOWSHEET NOTE
[facility], [address]  Phone: [phone]  Fax [phone]      Date:  3/14/2023    Patient Name:  Irina Farias    :  1960  MRN: [id number]  Restrictions/Precautions:    Medical/Treatment Diagnosis Information:   none - general strengthening     Physician Information:   none    Patient is Post-Op [] Yes   [] No     DOS:      Charge Package $210: 21, 3/8/22, 22, 22, 22, 1   3/14/23       Subjective Doing well                        6/7 1/5 2/5 3/5 4/5   Objective Hamstring 90   HEP [facility]  [id number]            Goals 1.  Decrease back pain, TA strengthening               Reformer Exercises    Lumbar Roll    3 folded towels under head with headrest up #7  15x 2R1G        15x        15x/10sec stretch        U squat 2R1B 20x #3  15x 2R1G        15x        15x/10sec stretch        U squat 2R1B 20x #7  2x10 2R1G        2x10        2x10        U squat 2R1B 15x   #3  2x10 2R1G        2x10        2x10        U squat 2R1B 15x #7  25x 2R1G, ER        2x10        2x10/10sec stretch        U squat 2R1B 20x   Pelvic Stabilization   #3 2x10 #4 Bridges 2R1B on reformer #4 2x10 #3 2x10  No springs #3 2x10    #12 Standing 1R1Y 2x10 8# 2x10 #8 15x #8 15x #11 2x10                   Trunk Stabilization #5 10x3 marches at a time    4x 8 sec with lat pull #6 1R 15x   #6 15x #5 10x3 marches at a time    Tabletop position practice 4x 8sec                           Hip Disassociation #8 2R 2x10 5# 2R 2x10 #5 2x10 2R #5 2x10 2R1B #8 2x10 2R1B     Arcs, V's circles       #4 Trap table marches and biking long yellow spring around thigh 12x #4 Trap table marches and biking long yellow spring around thigh 12x #4 Trap table marches and biking long yellow spring around thigh 12x  #        Scapular Stabilization #11 rows 2R 2x10 #1 seated on box  Biceps, rows 1R1B  Ext 1R #1 seated on box  Biceps, rows 1R1B  Ext 1R #1 seated on box  Biceps, rows 1R1B  Ext 1R                            Thoracic Mobility #6 3B 15x  Further extension #2 1R 10x on reformer #2 1R 10x on reformer #2 10x on reformer #6 3B 10x                             General ROM Hamstring 1 min #7 1 min #7 1 min #7 1 min #9 1 min with ER    #10 post capsule    #10 post capsule    Psoas 1R 1 min   Did not feel stretch as well               Other  #1 posterior pelvic tilt 10x  #2 SB knee to chest 15x   #1 posterior pelvic tilt 10x  #2 SB knee to chest 15x                           Summary/Comments HEP: bridges, laying flat hamstring stretches  Continue to progress Access Code: [email or web address]  URL: [email or web address] com/  Date: 07/27/2021  Prepared by: Roberta Giron    Exercises  Supine Posterior Pelvic Tilt - 1 x daily - 7 x weekly - 2 sets - 10 reps  Supine Hip and Knee Flexion AROM with Swiss Ball - 1 x daily - 7 x weekly - 2 sets - 10 reps  Seated Thoracic Extension AROM - 1 x daily - 7 x weekly - 1 sets - 10 reps  Cat-Camel - 1 x daily - 7 x weekly - 1 sets - 10 reps                                     Electronically signed by:  Roberta Giron

## 2023-03-21 ENCOUNTER — HOSPITAL ENCOUNTER (OUTPATIENT)
Dept: PHYSICAL THERAPY | Age: 63
Discharge: HOME | End: 2023-03-21

## 2023-03-21 PROCEDURE — 9990000029 HC GAP PACKAGE: Performed by: SPECIALIST/TECHNOLOGIST

## 2023-03-21 NOTE — FLOWSHEET NOTE
#1 seated on box  Biceps, rows 1R1B  Ext 1R                            Thoracic Mobility #6 3B 15x  Further extension #2 1R 10x on reformer #2 1R 10x on reformer #2 10x on reformer #6 3B 10x                             General ROM Hamstring 1 min #7 1 min #7 1 min #7 1 min #9 1 min with ER    #10 post capsule    #10 post capsule    Psoas 1R 1 min   Did not feel stretch as well               Other  #1 posterior pelvic tilt 10x  #2 SB knee to chest 15x   #1 posterior pelvic tilt 10x  #2 SB knee to chest 15x                           Summary/Comments HEP: bridges, laying flat hamstring stretches  Continue to progress Access Code: [email or web address]  URL: [email or web address]  Date: 07/27/2021  Prepared by: Sallie Rowe    Exercises  Supine Posterior Pelvic Tilt - 1 x daily - 7 x weekly - 2 sets - 10 reps  Supine Hip and Knee Flexion AROM with Swiss Ball - 1 x daily - 7 x weekly - 2 sets - 10 reps  Seated Thoracic Extension AROM - 1 x daily - 7 x weekly - 1 sets - 10 reps  Cat-Camel - 1 x daily - 7 x weekly - 1 sets - 10 reps                                     Electronically signed by:  Sallie Rowe

## 2023-03-28 ENCOUNTER — HOSPITAL ENCOUNTER (OUTPATIENT)
Dept: PHYSICAL THERAPY | Age: 63
Discharge: HOME | End: 2023-03-28

## 2023-03-28 NOTE — FLOWSHEET NOTE
rows 1R1B  Ext 1R #1 seated on box  Biceps, rows 1R1B  Ext 1R                            Thoracic Mobility #6 3B 15x  Further extension #2 1R 10x on reformer #2 1R 10x on reformer #2 10x on reformer #6 3B 10x                             General ROM Hamstring 1 min #7 1 min #7 1 min #7 1 min #9 1 min with ER    #10 post capsule    #10 post capsule    Psoas 1R 1 min   Did not feel stretch as well               Other  #1 posterior pelvic tilt 10x  #2 SB knee to chest 15x   #1 posterior pelvic tilt 10x  #2 SB knee to chest 15x                           Summary/Comments HEP: bridges, laying flat hamstring stretches  Continue to progress Access Code: [email or web address]  URL: [email or web address] com/  Date: 07/27/2021  Prepared by: Miri Brock    Exercises  Supine Posterior Pelvic Tilt - 1 x daily - 7 x weekly - 2 sets - 10 reps  Supine Hip and Knee Flexion AROM with Swiss Ball - 1 x daily - 7 x weekly - 2 sets - 10 reps  Seated Thoracic Extension AROM - 1 x daily - 7 x weekly - 1 sets - 10 reps  Cat-Camel - 1 x daily - 7 x weekly - 1 sets - 10 reps                                     Electronically signed by:  Miri Brock

## 2023-04-04 ENCOUNTER — HOSPITAL ENCOUNTER (OUTPATIENT)
Dept: PHYSICAL THERAPY | Age: 63
Discharge: HOME | End: 2023-04-04

## 2023-04-04 NOTE — FLOWSHEET NOTE
[facility], [address]  Phone: [phone]  Fax [phone]      Date:  2023    Patient Name:  Kathy Cherry    :  1960  MRN: [id number]  Restrictions/Precautions:    Medical/Treatment Diagnosis Information:   none - general strengthening     Physician Information:   none    Patient is Post-Op [] Yes   [] No     DOS:      Charge Package $210: 21, 3/8/22, 22, 22, 22, 13/21/23     3/14/23 3/21/23 3/28/23 4/4/23    Subjective Doing well Doing well Doing well Doing well                     6/7 1/5 2/5 3/5 4/5   Objective Hamstring 90   HEP [employer]  [id number]            Goals 1.  Decrease back pain, TA strengthening               Reformer Exercises    Lumbar Roll    3 folded towels under head with headrest up #7  15x 2R1G        15x        15x/10sec stretch        U squat 2R1B 20x #3  15x 2R1G        15x        15x/10sec stretch        U squat 2R1B 20x #7  2x10 2R1G        2x10        2x10        U squat 2R1B 15x   #3  2x10 2R1G        2x10        2x10        U squat 2R1B 15x #7  25x 2R1G, ER        2x10        2x10/10sec stretch        U squat 2R1B 20x   Pelvic Stabilization   #3 2x10 #4 Bridges 2R1B on reformer #4 2x10 #3 2x10  No springs #3 2x10    #12 Standing 1R1Y 2x10 8# 2x10 #8 15x #8 15x #11 2x10                   Trunk Stabilization #5 10x3 marches at a time    4x 8 sec with lat pull #6 1R 15x   #6 15x #5 10x3 marches at a time    Tabletop position practice 4x 8sec                           Hip Disassociation #8 2R 2x10 5# 2R 2x10 #5 2x10 2R #5 2x10 2R1B #8 2x10 2R1B     Arcs, V's circles       #4 Trap table marches and biking long yellow spring around thigh 12x #4 Trap table marches and biking long yellow spring around thigh 12x #4 Trap table marches and biking long yellow spring around thigh 12x  #        Scapular Stabilization #11 rows 2R 2x10 #1 seated on box  Biceps, rows 1R1B  Ext 1R #1 seated on

## 2023-05-16 ENCOUNTER — HOSPITAL ENCOUNTER (OUTPATIENT)
Dept: PHYSICAL THERAPY | Age: 63
Discharge: HOME | End: 2023-05-16

## 2023-05-16 NOTE — FLOWSHEET NOTE
box  Biceps, rows 1R1B  Ext 1R                            Thoracic Mobility #6 3B 15x  Further extension #2 1R 10x on reformer #2 1R 10x on reformer #2 10x on reformer #6 3B 10x                             General ROM Hamstring 1 min #7 1 min #7 1 min #7 1 min #9 1 min with ER    #10 post capsule    #10 post capsule    Psoas 1R 1 min   Did not feel stretch as well               Other  #1 posterior pelvic tilt 10x  #2 SB knee to chest 15x   #1 posterior pelvic tilt 10x  #2 SB knee to chest 15x                           Summary/Comments HEP: bridges, laying flat hamstring stretches  Continue to progress Access Code: [email or web address]  URL: [email or web address] com/  Date: 07/27/2021  Prepared by: Tg Valente    Exercises  Supine Posterior Pelvic Tilt - 1 x daily - 7 x weekly - 2 sets - 10 reps  Supine Hip and Knee Flexion AROM with Swiss Ball - 1 x daily - 7 x weekly - 2 sets - 10 reps  Seated Thoracic Extension AROM - 1 x daily - 7 x weekly - 1 sets - 10 reps  Cat-Camel - 1 x daily - 7 x weekly - 1 sets - 10 reps                                     Electronically signed by:  Tg Valente

## 2023-05-23 ENCOUNTER — HOSPITAL ENCOUNTER (OUTPATIENT)
Dept: PHYSICAL THERAPY | Age: 63
Discharge: HOME | End: 2023-05-23

## 2023-05-23 PROCEDURE — 9990000029 HC GAP PACKAGE: Performed by: SPECIALIST/TECHNOLOGIST

## 2023-05-23 NOTE — FLOWSHEET NOTE
1R1B  Ext 1R #1 seated on box  Biceps, rows 1R1B  Ext 1R                            Thoracic Mobility #6 3B 15x  Further extension #2 1R 10x on reformer #2 1R 10x on reformer #2 10x on reformer #6 3B 10x                             General ROM Hamstring 1 min #7 1 min #7 1 min #7 1 min #9 1 min with ER    #10 post capsule    #10 post capsule    Psoas 1R 1 min   Did not feel stretch as well               Other  #1 posterior pelvic tilt 10x  #2 SB knee to chest 15x   #1 posterior pelvic tilt 10x  #2 SB knee to chest 15x                           Summary/Comments HEP: bridges, laying flat hamstring stretches  Continue to progress Access Code: [email or web address]  URL: [email or web address]. com/  Date: 07/27/2021  Prepared by: Mari James    Exercises  Supine Posterior Pelvic Tilt - 1 x daily - 7 x weekly - 2 sets - 10 reps  Supine Hip and Knee Flexion AROM with Swiss Ball - 1 x daily - 7 x weekly - 2 sets - 10 reps  Seated Thoracic Extension AROM - 1 x daily - 7 x weekly - 1 sets - 10 reps  Cat-Camel - 1 x daily - 7 x weekly - 1 sets - 10 reps                                     Electronically signed by:  Mari James

## 2023-05-30 ENCOUNTER — HOSPITAL ENCOUNTER (OUTPATIENT)
Dept: PHYSICAL THERAPY | Age: 63
Discharge: HOME | End: 2023-05-30

## 2023-05-30 NOTE — FLOWSHEET NOTE
[facility], [address]  Phone: [phone]  Fax [phone]      Date:  2023    Patient Name:  Lizbeth Hernandez    :  1960  MRN: [id number]  Restrictions/Precautions:    Medical/Treatment Diagnosis Information:   none - general strengthening     Physician Information:   none    Patient is Post-Op [] Yes   [] No     DOS:      Charge Package $210: 21, 3/8/22, 22, 22, 22, , 23     Subjective Doing well Doing well Doing well                      5/5 1/5 2/5 3/5 4/5   Objective Hamstring 90   HEP [employer]  [id number]            Goals 1.  Decrease back pain, TA strengthening               Reformer Exercises    Lumbar Roll    3 folded towels under head with headrest up #7  15x 2R1G        15x        15x/10sec stretch        U squat 2R1B 20x #3  15x 2R1G        15x        15x/10sec stretch        U squat 2R1B 20x #7  2x10 2R1G        2x10        2x10        U squat 2R1B 15x   #3  2x10 2R1G        2x10        2x10        U squat 2R1B 15x #7  25x 2R1G, ER        2x10        2x10/10sec stretch        U squat 2R1B 20x   Pelvic Stabilization   #3 2x10 #4 Bridges 2R1B on reformer #4 2x10 #3 2x10  No springs #3 2x10    #12 Standing 1R1Y 2x10 8# 2x10 #8 15x #8 15x #11 2x10                   Trunk Stabilization #5 10x3 marches at a time    4x 8 sec with lat pull #6 1R 15x   #6 15x #5 10x3 marches at a time    Tabletop position practice 4x 8sec      Quadraped 1R                     Hip Disassociation #8 2R 2x10 5# 2R 2x10 #5 2x10 2R #5 2x10 2R1B #8 2x10 2R1B     Arcs, V's circles       #4 Trap table marches and biking long yellow spring around thigh 12x #4 Trap table marches and biking long yellow spring around thigh 12x #4 Trap table marches and biking long yellow spring around thigh 12x  #        Scapular Stabilization #11 rows 2R 2x10 #1 seated on box  Biceps, rows 1R1B  Ext 1R #1

## 2023-06-06 ENCOUNTER — HOSPITAL ENCOUNTER (OUTPATIENT)
Dept: PHYSICAL THERAPY | Age: 63
Discharge: HOME | End: 2023-06-06

## 2023-06-06 NOTE — FLOWSHEET NOTE
1R1B  Ext 1R #1 seated on box  Biceps, rows 1R1B  Ext 1R #1 seated on box  Biceps, rows 1R1B  Ext 1R                            Thoracic Mobility #6 3B 15x  Further extension #2 1R 10x on reformer #2 1R 10x on reformer #2 10x on reformer #6 3B 10x                             General ROM Hamstring 1 min #7 1 min #7 1 min #7 1 min #9 1 min with ER    #10 post capsule    #10 post capsule    Psoas 1R 1 min   Did not feel stretch as well               Other  #1 posterior pelvic tilt 10x  #2 SB knee to chest 15x   #1 posterior pelvic tilt 10x  #2 SB knee to chest 15x                           Summary/Comments HEP: bridges, laying flat hamstring stretches  Continue to progress Access Code: [email or web address]  URL: [email or web address] com/  Date: 07/27/2021  Prepared by: Vera Medeiros    Exercises  Supine Posterior Pelvic Tilt - 1 x daily - 7 x weekly - 2 sets - 10 reps  Supine Hip and Knee Flexion AROM with Swiss Ball - 1 x daily - 7 x weekly - 2 sets - 10 reps  Seated Thoracic Extension AROM - 1 x daily - 7 x weekly - 1 sets - 10 reps  Cat-Camel - 1 x daily - 7 x weekly - 1 sets - 10 reps                                     Electronically signed by:  Vera Medeiros

## 2023-06-19 ENCOUNTER — HOSPITAL ENCOUNTER (OUTPATIENT)
Dept: PHYSICAL THERAPY | Age: 63
Discharge: HOME | End: 2023-06-19

## 2023-06-19 NOTE — FLOWSHEET NOTE
[address]  Phone: [phone]  Fax [phone]      Date:  2023    Patient Name:  Mirta Catalan    :  1960  MRN: [id number]  Restrictions/Precautions:    Medical/Treatment Diagnosis Information:   none - general strengthening     Physician Information:   none    Patient is Post-Op [] Yes   [] No     DOS:      Charge Package $210: 21, 3/8/22, 22, 22, 22, , 23       Subjective Doing well                               Objective Hamstring 90   HEP [employer]  [id number]            Goals 1.  Decrease back pain, TA strengthening               Reformer Exercises    Lumbar Roll    3 folded towels under head with headrest up #7  15x 2R1G        15x        15x/10sec stretch        U squat 2R1B 20x #3  15x 2R1G        15x        15x/10sec stretch        U squat 2R1B 20x #7  2x10 2R1G        2x10        2x10        U squat 2R1B 15x   #3  2x10 2R1G        2x10        2x10        U squat 2R1B 15x #7  25x 2R1G, ER        2x10        2x10/10sec stretch        U squat 2R1B 20x   Pelvic Stabilization   #3 2x10 #4 Bridges 2R1B on reformer #4 2x10 #3 2x10  No springs #3 2x10    #12 Standing 1R1Y 2x10 8# 2x10 #8 15x #8 15x #11 2x10                   Trunk Stabilization #5 10x3 marches at a time    4x 8 sec with lat pull #6 1R 15x   #6 15x #5 10x3 marches at a time    Tabletop position practice 4x 8sec      Quadraped 1R                     Hip Disassociation #8 2R 2x10 5# 2R 2x10 #5 2x10 2R #5 2x10 2R1B #8 2x10 2R1B     Arcs, V's circles       #4 Trap table marches and biking long yellow spring around thigh 12x #4 Trap table marches and biking long yellow spring around thigh 12x #4 Trap table marches and biking long yellow spring around thigh 12x  #        Scapular Stabilization #11 rows 2R 2x10 #1 seated on box  Biceps, rows 1R1B  Ext 1R #1 seated on box  Biceps, rows 1R1B  Ext 1R #1